# Patient Record
Sex: MALE | Race: WHITE | HISPANIC OR LATINO | URBAN - METROPOLITAN AREA
[De-identification: names, ages, dates, MRNs, and addresses within clinical notes are randomized per-mention and may not be internally consistent; named-entity substitution may affect disease eponyms.]

---

## 2022-12-13 ENCOUNTER — TELEPHONE (OUTPATIENT)
Dept: HEALTH INFORMATION MANAGEMENT | Facility: OTHER | Age: 67
End: 2022-12-13

## 2022-12-21 PROBLEM — E11.9 TYPE 2 DIABETES MELLITUS WITHOUT COMPLICATION, WITHOUT LONG-TERM CURRENT USE OF INSULIN (HCC): Status: ACTIVE | Noted: 2022-12-21

## 2022-12-21 PROBLEM — E66.811 OBESITY (BMI 30.0-34.9): Status: ACTIVE | Noted: 2022-12-21

## 2022-12-21 PROBLEM — E66.9 OBESITY (BMI 30.0-34.9): Status: ACTIVE | Noted: 2022-12-21

## 2022-12-21 PROBLEM — R93.1 AGATSTON CAC SCORE 100-199: Status: ACTIVE | Noted: 2022-12-21

## 2022-12-21 PROBLEM — E78.2 MIXED HYPERLIPIDEMIA: Status: ACTIVE | Noted: 2022-12-21

## 2022-12-21 PROBLEM — E11.65 TYPE 2 DIABETES MELLITUS WITH HYPERGLYCEMIA, WITHOUT LONG-TERM CURRENT USE OF INSULIN (HCC): Status: ACTIVE | Noted: 2022-12-21

## 2022-12-21 PROBLEM — I10 ESSENTIAL HYPERTENSION: Status: ACTIVE | Noted: 2022-12-21

## 2022-12-21 PROBLEM — G47.00 INSOMNIA: Status: ACTIVE | Noted: 2022-12-21

## 2023-01-11 ENCOUNTER — OFFICE VISIT (OUTPATIENT)
Dept: MEDICAL GROUP | Facility: PHYSICIAN GROUP | Age: 68
End: 2023-01-11
Payer: MEDICARE

## 2023-01-11 VITALS
OXYGEN SATURATION: 95 % | RESPIRATION RATE: 12 BRPM | BODY MASS INDEX: 33.19 KG/M2 | SYSTOLIC BLOOD PRESSURE: 120 MMHG | TEMPERATURE: 97.7 F | HEART RATE: 90 BPM | WEIGHT: 219 LBS | DIASTOLIC BLOOD PRESSURE: 64 MMHG | HEIGHT: 68 IN

## 2023-01-11 DIAGNOSIS — E66.9 OBESITY (BMI 30.0-34.9): ICD-10-CM

## 2023-01-11 DIAGNOSIS — E11.9 DIABETES MELLITUS WITHOUT COMPLICATION (HCC): ICD-10-CM

## 2023-01-11 DIAGNOSIS — E78.2 MIXED HYPERLIPIDEMIA: ICD-10-CM

## 2023-01-11 DIAGNOSIS — I10 ESSENTIAL HYPERTENSION: ICD-10-CM

## 2023-01-11 PROBLEM — E11.65 TYPE 2 DIABETES MELLITUS WITH HYPERGLYCEMIA, WITHOUT LONG-TERM CURRENT USE OF INSULIN (HCC): Status: RESOLVED | Noted: 2022-12-21 | Resolved: 2023-01-11

## 2023-01-11 LAB
HBA1C MFR BLD: 7 % (ref 0–5.6)
INT CON NEG: NEGATIVE
INT CON POS: POSITIVE

## 2023-01-11 PROCEDURE — 99204 OFFICE O/P NEW MOD 45 MIN: CPT | Performed by: FAMILY MEDICINE

## 2023-01-11 PROCEDURE — 83036 HEMOGLOBIN GLYCOSYLATED A1C: CPT | Performed by: FAMILY MEDICINE

## 2023-01-11 RX ORDER — METFORMIN HYDROCHLORIDE 500 MG/1
1 TABLET, EXTENDED RELEASE ORAL 2 TIMES DAILY
COMMUNITY
Start: 2022-08-30 | End: 2023-01-11

## 2023-01-11 RX ORDER — AMOXICILLIN AND CLAVULANATE POTASSIUM 875; 125 MG/1; MG/1
1 TABLET, FILM COATED ORAL 2 TIMES DAILY
COMMUNITY
Start: 2022-11-08 | End: 2023-03-01

## 2023-01-11 RX ORDER — LISINOPRIL 5 MG/1
1 TABLET ORAL
COMMUNITY
Start: 2022-11-23 | End: 2023-01-11

## 2023-01-11 RX ORDER — ATORVASTATIN CALCIUM 40 MG/1
1 TABLET, FILM COATED ORAL
COMMUNITY
Start: 2022-11-23 | End: 2023-01-11

## 2023-01-11 RX ORDER — BLOOD SUGAR DIAGNOSTIC
STRIP MISCELLANEOUS
COMMUNITY
Start: 2022-10-24

## 2023-01-11 RX ORDER — TIRZEPATIDE 2.5 MG/.5ML
2.5 INJECTION, SOLUTION SUBCUTANEOUS
COMMUNITY
Start: 2022-11-08 | End: 2023-03-01

## 2023-01-11 ASSESSMENT — ENCOUNTER SYMPTOMS
CHILLS: 0
HEARTBURN: 0
HEADACHES: 0
PSYCHIATRIC NEGATIVE: 1
COUGH: 0
FEVER: 0
BRUISES/BLEEDS EASILY: 0
CONSTITUTIONAL NEGATIVE: 1
RESPIRATORY NEGATIVE: 1
MYALGIAS: 0
MUSCULOSKELETAL NEGATIVE: 1
DEPRESSION: 0
EYES NEGATIVE: 1
BLURRED VISION: 0
NAUSEA: 0
DIZZINESS: 0
CARDIOVASCULAR NEGATIVE: 1
PALPITATIONS: 0
TINGLING: 0
GASTROINTESTINAL NEGATIVE: 1
HEMOPTYSIS: 0
DOUBLE VISION: 0
NEUROLOGICAL NEGATIVE: 1

## 2023-01-11 ASSESSMENT — FIBROSIS 4 INDEX: FIB4 SCORE: 1.34

## 2023-01-11 ASSESSMENT — PATIENT HEALTH QUESTIONNAIRE - PHQ9: CLINICAL INTERPRETATION OF PHQ2 SCORE: 0

## 2023-01-11 NOTE — PROGRESS NOTES
Subjective     Julian Sellers Jr is a 67 y.o. male who presents with [facility]            1. Diabetes mellitus without complication (HCC)  New patient to us and [facility]  Was only on metformin and a1c over 8 but addition last year of trulicity improved it and now 7.0 today in clinic  Unsure if this is covered with new insurance, will have him see pharm to check on cost  Currently treated for DM, taking meds and checking bs at home, trying to do DM diet.controlled       POCT Hemoglobin A1C   Referral to Pharmacotherapy Service   Comp Metabolic Panel; Future   Hemoglobin A1c; Future   Lipid Profile; Future   Microalbumin Creat Ratio Urine - Lab Collect; Future    2. Essential hypertension  Currently treated for HTN, taking meds with no CP or sob, monitors bp at home periodically. controlled        3. Mixed hyperlipidemia  Currently treated for HLD, taking meds with no new myalgias or joint pain, watching fats in diet  controlled        4. Obesity (BMI 30.0-34.9)  Patient has a diagnosis of obesity. They were counseled today on increasing exercise and  extensively counseled on a low carb diet         No past medical history on file.  No past surgical history on file.  Social History    Tobacco Use      Smoking status: Former        Packs/day: 0.25        Years: 15.00        Pack years: 3.75        Types: Cigarettes        Quit date:         Years since quittin.0      Smokeless tobacco: Never    No family history on file.      Current Outpatient Medications: ·  Multiple Vitamin (MULTI-VITAMINS PO), 1 tab once a day, Disp: , Rfl: ·  aspirin 81 MG EC tablet, one orally daily for 90 days, Disp: , Rfl: ·  atorvastatin (LIPITOR) 40 MG Tab, Take 40 mg by mouth every day., Disp: , Rfl: ·  lisinopril (PRINIVIL) 5 MG Tab, Take 5 mg by mouth every day., Disp: , Rfl: ·  TRULICITY 1.5 MG/0.5ML Solution Pen-injector, INJECT 0.5 MLS (1.5 MG) SUBCUTANEOUSLY ONCE A WEEK FOR 90 DAYS. DISPENSE 12 PENS, Disp: , Rfl: ·   loratadine (CLARITIN) 10 MG Tab, TAKE 1 TABLET (10 MG) BY MOUTH DAILY AS NEEDED FOR ALLERGIES, Disp: , Rfl: ·  metFORMIN ER (GLUCOPHAGE XR) 500 MG TABLET SR 24 HR, Take 500 mg by mouth 2 times a day., Disp: , Rfl: ·  Omega-3 Fatty Acids (OMEGA-3 FISH OIL PO), Take 1 Canister by mouth every day. 800 mg, Disp: , Rfl: ·  amoxicillin-clavulanate (AUGMENTIN) 875-125 MG Tab, Take 1 Tablet by mouth 2 times a day. (Patient not taking: Reported on 1/11/2023), Disp: , Rfl: ·  Dulaglutide 1.5 MG/0.5ML Solution Pen-injector, Inject 1.5 mg under the skin. (Patient not taking: Reported on 1/11/2023), Disp: , Rfl: ·  Tirzepatide (MOUNJARO) 2.5 MG/0.5ML Solution Pen-injector, Inject 2.5 mg under the skin. (Patient not taking: Reported on 1/11/2023), Disp: , Rfl: ·  ONETOUCH VERIO strip, USE 1 STRIP TO TEST BLOOD GLUCOSE ONCE DAILY (Patient not taking: Reported on 1/11/2023), Disp: , Rfl: ·  diphenhydrAMINE-APAP, sleep, (TYLENOL PM EXTRA STRENGTH)  MG Tab, Take 1 Tablet by mouth at bedtime as needed. (Patient not taking: Reported on 1/11/2023), Disp: , Rfl:     Patient was instructed on the use of medications, either prescriptions or OTC and informed on when the appropriate follow up time period should be. In addition, patient was also instructed that should any acute worsening occur that they should notify this clinic asap or call 911.            Review of Systems   Constitutional: Negative.  Negative for chills and fever.   HENT: Negative.  Negative for hearing loss.    Eyes: Negative.  Negative for blurred vision and double vision.   Respiratory: Negative.  Negative for cough and hemoptysis.    Cardiovascular: Negative.  Negative for chest pain and palpitations.   Gastrointestinal: Negative.  Negative for heartburn and nausea.   Genitourinary: Negative.  Negative for dysuria.   Musculoskeletal: Negative.  Negative for myalgias.   Skin: Negative.  Negative for rash.   Neurological: Negative.  Negative for dizziness, tingling  "and headaches.   Endo/Heme/Allergies: Negative.  Does not bruise/bleed easily.   Psychiatric/Behavioral: Negative.  Negative for depression and suicidal ideas.    All other systems reviewed and are negative.           Objective     /64 (BP Location: Right arm, Patient Position: Sitting, BP Cuff Size: Adult)   Pulse 90   Temp 36.5 °C (97.7 °F) (Temporal)   Resp 12   Ht 1.715 m (5' 7.5\")   Wt 99.3 kg (219 lb)   SpO2 95%   BMI 33.79 kg/m²      Physical Exam  Vitals and nursing note reviewed.   Constitutional:       General: He is not in acute distress.     Appearance: He is well-developed. He is not diaphoretic.   HENT:      Head: Normocephalic and atraumatic.      Mouth/Throat:      Pharynx: No oropharyngeal exudate.   Eyes:      Pupils: Pupils are equal, round, and reactive to light.   Cardiovascular:      Rate and Rhythm: Normal rate and regular rhythm.      Heart sounds: Normal heart sounds. No murmur heard.    No friction rub. No gallop.   Pulmonary:      Effort: Pulmonary effort is normal. No respiratory distress.      Breath sounds: Normal breath sounds. No wheezing or rales.   Chest:      Chest wall: No tenderness.   Skin:     Comments: Feet clean and dry with good sensation on monofilament exam today     Neurological:      Mental Status: He is alert and oriented to person, place, and time.   Psychiatric:         Behavior: Behavior normal.         Thought Content: Thought content normal.         Judgment: Judgment normal.                           Assessment & Plan        1. Diabetes mellitus without complication (HCC)    - POCT Hemoglobin A1C  - Referral to Pharmacotherapy Service  - Comp Metabolic Panel; Future  - Hemoglobin A1c; Future  - Lipid Profile; Future  - Microalbumin Creat Ratio Urine - Lab Collect; Future    2. Essential hypertension      3. Mixed hyperlipidemia      4. Obesity (BMI 30.0-34.9)                    "

## 2023-01-12 ENCOUNTER — TELEPHONE (OUTPATIENT)
Dept: VASCULAR LAB | Facility: MEDICAL CENTER | Age: 68
End: 2023-01-12
Payer: MEDICARE

## 2023-01-12 NOTE — TELEPHONE ENCOUNTER
[facility]    Received pharmacotherapy referral for DM from Dr. Travis on 1/11/23    Called pt to schedule appt to establish care - no answer. LVM.    Insurance: [facility]  PCP: [facility]  Locations to be seen: Any    [facility] at [phone], fax [phone]    Bala López, HankD, BCACP

## 2023-02-08 ENCOUNTER — APPOINTMENT (OUTPATIENT)
Dept: MEDICAL GROUP | Facility: PHYSICIAN GROUP | Age: 68
End: 2023-02-08
Payer: MEDICARE

## 2023-03-01 ENCOUNTER — NON-PROVIDER VISIT (OUTPATIENT)
Dept: MEDICAL GROUP | Facility: PHYSICIAN GROUP | Age: 68
End: 2023-03-01
Payer: MEDICARE

## 2023-03-01 PROCEDURE — 99211 OFF/OP EST MAY X REQ PHY/QHP: CPT | Performed by: STUDENT IN AN ORGANIZED HEALTH CARE EDUCATION/TRAINING PROGRAM

## 2023-03-01 RX ORDER — METFORMIN HYDROCHLORIDE 500 MG/1
1 TABLET, EXTENDED RELEASE ORAL 2 TIMES DAILY
COMMUNITY
Start: 2023-02-22 | End: 2023-12-05 | Stop reason: SDUPTHER

## 2023-03-01 RX ORDER — DULAGLUTIDE 1.5 MG/.5ML
1 INJECTION, SOLUTION SUBCUTANEOUS
Qty: 2.24 ML | Refills: 6 | Status: SHIPPED | OUTPATIENT
Start: 2023-03-01 | End: 2023-07-19

## 2023-03-01 NOTE — PROGRESS NOTES
Patient Consult Note - Initial Visit    TIME IN: 2:30pm  TIME OUT: 2:56pm    Primary care physician: Wilfredo Travis M.D.    Reason for consult: Management of Uncontrolled Type 2 Diabetes    HPI:  Julian Sellers Jr is a 67 y.o. old patient who comes in today for evaluation of above stated problem.    Most Recent HbA1c:   Lab Results   Component Value Date/Time    HBA1C 7.0 (A) 01/11/2023 08:59 AM      Lab Results   Component Value Date/Time    CREATININE 0.88 06/18/2022 09:05 AM              Diabetes Medication History and Current Regimen  Metformin ER 500mg BID   GLP-1 Agent: Dulaglutide 1.5 mg once weekly     Previously attempted medications  Rybelsus - stopped in favor of Trulicity     Pt has home glucometer and proper testing technique - Yes    Pt reports blood sugars:   Before Breakfast: 110-130, will spike to 170's-180's if poor diet in evening  Before Lunch:   Before Dinner:   Before Bedtime:   Other times:     Hypoglycemia awareness - Yes  Nocturnal hypoglycemia- None  Hypoglycemia:  None    Pt's treatment of Hypoglycemia - 15:15 Rule    Current Exercise - 45-60min ~three to four days a week via hiking and treadmill walking.  Exercise Goal - continue with current exercise patterns    Dietary -  Well rounded diet with good portion control.  Had diabetic nutrition education in the past.    Tries to keep things low simple carb and focus on things such as vegetables, chicken/fish/pork.  Does have cereal and oatmeal for breakfast frequently, but tries to eat options that are high in fiber.  Does snack on almonds and citrus fruits during the day.  Well hydrated with water, no sugary drinks.    Foot Exam:  Monofilament exam - up to date, not conducted today.    Preventative Management  BP regimen (ACE/ARB) - Lisinopril 5mg QD  ASA - 81mg QD  Statin - Atorvastatin 40mg QD  Last Retinal Scan - 12/2022  Last Foot Exam - 1/2023  Last A1c -   Lab Results   Component Value Date/Time    HBA1C 7.0 (A) 01/11/2023 08:59  AM      Last Microalbuminuria - ordered by PCP to have done by 2023    Past Medical History:  Patient Active Problem List    Diagnosis Date Noted    Diabetes mellitus without complication (HCC) 2023    Mixed hyperlipidemia 2022    Essential hypertension 2022    Obesity (BMI 30.0-34.9) 2022    Agatston CAC score 100-199 2022    Insomnia 2022       Past Surgical History:  No past surgical history on file.    Allergies:  Patient has no known allergies.    Social History:  Social History     Socioeconomic History    Marital status: Single     Spouse name: Not on file    Number of children: Not on file    Years of education: Not on file    Highest education level: Not on file   Occupational History    Not on file   Tobacco Use    Smoking status: Former     Packs/day: 0.25     Years: 15.00     Pack years: 3.75     Types: Cigarettes     Quit date:      Years since quittin.    Smokeless tobacco: Never   Substance and Sexual Activity    Alcohol use: Not on file    Drug use: Not on file    Sexual activity: Not on file   Other Topics Concern    Not on file   Social History Narrative    Not on file     Social Determinants of Health     Financial Resource Strain: Not on file   Food Insecurity: Not on file   Transportation Needs: Not on file   Physical Activity: Not on file   Stress: Not on file   Social Connections: Not on file   Intimate Partner Violence: Not on file   Housing Stability: Not on file       Family History:  No family history on file.    Medications:    Current Outpatient Medications:     amoxicillin-clavulanate (AUGMENTIN) 875-125 MG Tab, Take 1 Tablet by mouth 2 times a day. (Patient not taking: Reported on 2023), Disp: , Rfl:     Dulaglutide 1.5 MG/0.5ML Solution Pen-injector, Inject 1.5 mg under the skin. (Patient not taking: Reported on 2023), Disp: , Rfl:     Tirzepatide (MOUNJARO) 2.5 MG/0.5ML Solution Pen-injector, Inject 2.5 mg under the skin.  (Patient not taking: Reported on 1/11/2023), Disp: , Rfl:     ONETOUCH VERIO strip, USE 1 STRIP TO TEST BLOOD GLUCOSE ONCE DAILY (Patient not taking: Reported on 1/11/2023), Disp: , Rfl:     Multiple Vitamin (MULTI-VITAMINS PO), 1 tab once a day, Disp: , Rfl:     aspirin 81 MG EC tablet, one orally daily for 90 days, Disp: , Rfl:     atorvastatin (LIPITOR) 40 MG Tab, Take 40 mg by mouth every day., Disp: , Rfl:     lisinopril (PRINIVIL) 5 MG Tab, Take 5 mg by mouth every day., Disp: , Rfl:     TRULICITY 1.5 MG/0.5ML Solution Pen-injector, INJECT 0.5 MLS (1.5 MG) SUBCUTANEOUSLY ONCE A WEEK FOR 90 DAYS. DISPENSE 12 PENS, Disp: , Rfl:     loratadine (CLARITIN) 10 MG Tab, TAKE 1 TABLET (10 MG) BY MOUTH DAILY AS NEEDED FOR ALLERGIES, Disp: , Rfl:     metFORMIN ER (GLUCOPHAGE XR) 500 MG TABLET SR 24 HR, Take 500 mg by mouth 2 times a day., Disp: , Rfl:     Omega-3 Fatty Acids (OMEGA-3 FISH OIL PO), Take 1 Canister by mouth every day. 800 mg, Disp: , Rfl:     diphenhydrAMINE-APAP, sleep, (TYLENOL PM EXTRA STRENGTH)  MG Tab, Take 1 Tablet by mouth at bedtime as needed. (Patient not taking: Reported on 1/11/2023), Disp: , Rfl:     Labs: Reviewed    Physical Examination:  Vital signs: There were no vitals taken for this visit. There is no height or weight on file to calculate BMI.    Assessment and Plan:    1. DM2  Patient seen today for initial visit, referred by PCP.  Longstanding hx of diabetes, previously managed by [facility] in [place].  A1c is under good control, his biggest concern was that Trulicity was >$700/month with previous insurance and he is not sure what [facility] is going to look like.    Basic physiology of DMII was explained to patient as well as microvascular/macrovascular complications. The importance of increasing physical activity to improve diabetes control was discussed with the patient. Patient was also educated on changing diet and making better choices to help control blood sugar.  Discussed FBG goal of  , 2hr PP <180, and A1c <7.0%.      - Medication changes -   None, continue with all current medications.  Cost of Trulicity should be much lower with SCP coverage.     - Lifestyle changes -   Diet:  Continue to minimize simple carb intake and focusing on lean proteins and veggies.  Exercise:  Continue with current exercise program.    Follow Up:  Six weeks for A1c    Kody Pugh, PharmDBCACP  03/01/23    CC:   Wilfredo Travis M.D.                                                   [facility] Program Consent      Name    Julian Sellers Jr    MRN number: [id number]    the following are guidelines for participation in the [facility] Program.     I, ____Julian Sellers Jr_____, understand and voluntarily agree to participate in the [facility] and to have services provided to me by pharmacists working in collaboration with my provider.    I understand the pharmacist within the [facility] Program may initiate, modify or discontinue my medications, order appropriate testing and appointments, perform exams, monitor treatment, and make clinical evaluations and decisions pursuant to a collaborative practice agreement with my provider.  I understand the pharmacist within the [facility] Program is not a physician, osteopathic physician, advanced practice registered nurse or physician assistant and may not diagnose.  I will take all my medications as instructed and not change the way I take it without first talking to my provider or a pharmacist within the [facility].  I understand that if I am late to my appointment I may not be able to be seen by a pharmacist at that time and will have to reschedule my appointment.  During appointment with pharmacist I understand that pharmacist has the right not to answer questions or perform services outside the pharmacist’s scope of practice.  By  signing below, I provide informed consent for the pharmacist to provide these services and for my participation in the [facility] Pharmacotherapy Program.      Julian Sellers            [id number]          03/01/23  Patient Name                   MRN number  Date     ___X_Obtained verbal consent from pt, No signature due to COVID concerns___   Patient Signature

## 2023-04-11 ENCOUNTER — HOSPITAL ENCOUNTER (OUTPATIENT)
Dept: LAB | Facility: MEDICAL CENTER | Age: 68
End: 2023-04-11
Attending: FAMILY MEDICINE
Payer: MEDICARE

## 2023-04-11 DIAGNOSIS — E11.9 DIABETES MELLITUS WITHOUT COMPLICATION (HCC): ICD-10-CM

## 2023-04-11 LAB
ALBUMIN SERPL BCP-MCNC: 4.4 G/DL (ref 3.2–4.9)
ALBUMIN/GLOB SERPL: 1.3 G/DL
ALP SERPL-CCNC: 61 U/L (ref 30–99)
ALT SERPL-CCNC: 12 U/L (ref 2–50)
ANION GAP SERPL CALC-SCNC: 13 MMOL/L (ref 7–16)
AST SERPL-CCNC: 12 U/L (ref 12–45)
BILIRUB SERPL-MCNC: 0.9 MG/DL (ref 0.1–1.5)
BUN SERPL-MCNC: 16 MG/DL (ref 8–22)
CALCIUM ALBUM COR SERPL-MCNC: 8.9 MG/DL (ref 8.5–10.5)
CALCIUM SERPL-MCNC: 9.2 MG/DL (ref 8.5–10.5)
CHLORIDE SERPL-SCNC: 103 MMOL/L (ref 96–112)
CHOLEST SERPL-MCNC: 99 MG/DL (ref 100–199)
CO2 SERPL-SCNC: 23 MMOL/L (ref 20–33)
CREAT SERPL-MCNC: 0.83 MG/DL (ref 0.5–1.4)
EST. AVERAGE GLUCOSE BLD GHB EST-MCNC: 137 MG/DL
FASTING STATUS PATIENT QL REPORTED: NORMAL
GFR SERPLBLD CREATININE-BSD FMLA CKD-EPI: 95 ML/MIN/1.73 M 2
GLOBULIN SER CALC-MCNC: 3.3 G/DL (ref 1.9–3.5)
GLUCOSE SERPL-MCNC: 106 MG/DL (ref 65–99)
HBA1C MFR BLD: 6.4 % (ref 4–5.6)
HDLC SERPL-MCNC: 43 MG/DL
LDLC SERPL CALC-MCNC: 35 MG/DL
POTASSIUM SERPL-SCNC: 4.2 MMOL/L (ref 3.6–5.5)
PROT SERPL-MCNC: 7.7 G/DL (ref 6–8.2)
SODIUM SERPL-SCNC: 139 MMOL/L (ref 135–145)
TRIGL SERPL-MCNC: 105 MG/DL (ref 0–149)

## 2023-04-11 PROCEDURE — 80053 COMPREHEN METABOLIC PANEL: CPT

## 2023-04-11 PROCEDURE — 80061 LIPID PANEL: CPT

## 2023-04-11 PROCEDURE — 36415 COLL VENOUS BLD VENIPUNCTURE: CPT

## 2023-04-11 PROCEDURE — 83036 HEMOGLOBIN GLYCOSYLATED A1C: CPT

## 2023-04-11 PROCEDURE — 82570 ASSAY OF URINE CREATININE: CPT

## 2023-04-11 PROCEDURE — 82043 UR ALBUMIN QUANTITATIVE: CPT

## 2023-04-12 ENCOUNTER — TELEPHONE (OUTPATIENT)
Dept: MEDICAL GROUP | Facility: PHYSICIAN GROUP | Age: 68
End: 2023-04-12
Payer: MEDICARE

## 2023-04-12 LAB
CREAT UR-MCNC: 147.38 MG/DL
MICROALBUMIN UR-MCNC: 1.6 MG/DL
MICROALBUMIN/CREAT UR: 11 MG/G (ref 0–30)

## 2023-04-12 NOTE — TELEPHONE ENCOUNTER
----- Message from Wilfredo Travis M.D. sent at 4/12/2023  7:17 AM PDT -----  This patient needs an appointment within the next week. Please schedule this with the patient so we may discuss their lab results

## 2023-04-12 NOTE — TELEPHONE ENCOUNTER
Attempted to contact patient to schedule with provider. Lvm for patient to [facility].

## 2023-04-19 ENCOUNTER — NON-PROVIDER VISIT (OUTPATIENT)
Dept: MEDICAL GROUP | Facility: PHYSICIAN GROUP | Age: 68
End: 2023-04-19
Payer: MEDICARE

## 2023-04-19 VITALS
BODY MASS INDEX: 34.23 KG/M2 | DIASTOLIC BLOOD PRESSURE: 84 MMHG | HEART RATE: 90 BPM | WEIGHT: 221.8 LBS | SYSTOLIC BLOOD PRESSURE: 125 MMHG

## 2023-04-19 PROCEDURE — 99211 OFF/OP EST MAY X REQ PHY/QHP: CPT | Performed by: NURSE PRACTITIONER

## 2023-04-19 ASSESSMENT — FIBROSIS 4 INDEX: FIB4 SCORE: 1.36

## 2023-04-19 NOTE — PROGRESS NOTES
Patient Consult Note    TIME IN: 1011  TIME OUT: 1032    Primary care physician: Wilfredo Travis M.D.    Reason for consult: Management of Controlled Type 2 Diabetes    HPI:  Julian Sellers Jr is a 67 y.o. old patient who comes in today for evaluation of above stated problem.    Most Recent HbA1c and POCT glucose:   Lab Results   Component Value Date/Time    HBA1C 6.4 (H) 04/11/2023 12:16 PM            Most Recent Scr:  Lab Results   Component Value Date/Time    CREATININE 0.83 04/11/2023 12:16 PM        Current Diabetes Medication Regimen  Metformin  mg bid   GLP-1 Agent: Trulicity 1.5mg q 7 days on Saturday- $120 for 90ds    Previous Diabetes Medications and Reason for Discontinuation  Metformin ER unable to tolerate 1500mg/day    Pt has home glucometer and proper testing technique -   Discussed BG Goals: FBG 80 - 130, 2hPP < 180, A1c < 7%    Pt reports blood sugars:   Before Breakfast: 110-120    Hypoglycemia awareness - yes  Nocturnal hypoglycemia- no  Hypoglycemia:  None    Pt's treatment of Hypoglycemia   - 15:15 Rule    Current Exercise - walking and using the treadmill (50min session) occassionally  Exercise Goal - increase as tolerated to 150min/week; start to incorporate strength training    Dietary - cutting out rice & potatoes; limiting bread. Increasing vegetable intake. Tries to cook most of his meals.    Foot Exam:  Monofilament exam - 1/11/2023  Monofilament testing with a 10 gram force: sensation intact: decreased bilaterally.    Visual Inspection: Feet without maceration, ulcers, fissures.  Feet dry.  Pedal pulses: intact bilaterally    Preventative Management  BP regimen (ACE/ARB) - lisinopril  BP goal < 140/90  Statin - atorvastatin  LDL <100 - 4/11/23 - 35   Last Retinal Scan - 12/6/2022  Last Microalbumin/Cr - 4/11/2023  Last A1c -   Lab Results   Component Value Date/Time    HBA1C 6.4 (H) 04/11/2023 12:16 PM          updated caregaps    Past Medical History:  Patient Active Problem  List    Diagnosis Date Noted    Diabetes mellitus without complication (HCC) 2023    Mixed hyperlipidemia 2022    Essential hypertension 2022    Obesity (BMI 30.0-34.9) 2022    Agatston CAC score 100-199 2022    Insomnia 2022       Past Surgical History:  No past surgical history on file.    Allergies:  Patient has no known allergies.    Social History:  Social History     Socioeconomic History    Marital status: Single     Spouse name: Not on file    Number of children: Not on file    Years of education: Not on file    Highest education level: Not on file   Occupational History    Not on file   Tobacco Use    Smoking status: Former     Packs/day: 0.25     Years: 15.00     Pack years: 3.75     Types: Cigarettes     Quit date:      Years since quittin.3    Smokeless tobacco: Never   Substance and Sexual Activity    Alcohol use: Not on file    Drug use: Not on file    Sexual activity: Not on file   Other Topics Concern    Not on file   Social History Narrative    Not on file     Social Determinants of Health     Financial Resource Strain: Not on file   Food Insecurity: Not on file   Transportation Needs: Not on file   Physical Activity: Not on file   Stress: Not on file   Social Connections: Not on file   Intimate Partner Violence: Not on file   Housing Stability: Not on file       Family History:  No family history on file.    Medications:    Current Outpatient Medications:     metFORMIN ER (GLUCOPHAGE XR) 500 MG TABLET SR 24 HR, Take 1 Tablet by mouth 2 times a day., Disp: , Rfl:     Dulaglutide (TRULICITY) 1.5 MG/0.5ML Solution Pen-injector, Inject 1 PEN under the skin every 7 days., Disp: 2.24 mL, Rfl: 6    ONETOUCH VERIO strip, , Disp: , Rfl:     Multiple Vitamin (MULTI-VITAMINS PO), 1 tab once a day, Disp: , Rfl:     aspirin 81 MG EC tablet, one orally daily for 90 days, Disp: , Rfl:     atorvastatin (LIPITOR) 40 MG Tab, Take 40 mg by mouth every day., Disp: , Rfl:      lisinopril (PRINIVIL) 5 MG Tab, Take 5 mg by mouth every day., Disp: , Rfl:     loratadine (CLARITIN) 10 MG Tab, TAKE 1 TABLET (10 MG) BY MOUTH DAILY AS NEEDED FOR ALLERGIES, Disp: , Rfl:     Omega-3 Fatty Acids (OMEGA-3 FISH OIL PO), Take 1 Canister by mouth every day. 800 mg, Disp: , Rfl:     Labs: Reviewed    Physical Examination:  Vital signs: /84   Pulse 90  There is no height or weight on file to calculate BMI.    Assessment and Plan:    1. DM2  Pt is tolerating his current medication regimen  Discussed Goals: FBG 80 - 130, 2hPP < 180, a1c < 7.0%   A1c now at goal 6.4% on 4/11/23 (decreased from 7.0%)  Pt has made significant changes to lifestyle - he is motivated to lose more weight however he is not ready to increase GLP1RA today. He wants to work on weight loss via T[facility]    - Medication changes:  Continue metformin ER 500mg bid  Continue Trulicity 1.5mg q 7 days     - Lifestyle changes:  Increase physical activity to 150min/week, start doing strength training  Continue to limit carb intake and increase protein/veggies intake    Follow Up:  3 months    Zandra Recio, PharmD    CC:   Wilfredo Travis M.D.

## 2023-05-05 PROBLEM — D75.1 POLYCYTHEMIA: Status: ACTIVE | Noted: 2023-05-05

## 2023-07-19 ENCOUNTER — NON-PROVIDER VISIT (OUTPATIENT)
Dept: MEDICAL GROUP | Facility: PHYSICIAN GROUP | Age: 68
End: 2023-07-19
Payer: MEDICARE

## 2023-07-19 DIAGNOSIS — E11.9 TYPE 2 DIABETES MELLITUS WITHOUT COMPLICATION, WITHOUT LONG-TERM CURRENT USE OF INSULIN (HCC): ICD-10-CM

## 2023-07-19 LAB
HBA1C MFR BLD: 7.4 % (ref ?–5.8)
POCT INT CON NEG: NEGATIVE
POCT INT CON POS: POSITIVE

## 2023-07-19 PROCEDURE — 99211 OFF/OP EST MAY X REQ PHY/QHP: CPT | Performed by: NURSE PRACTITIONER

## 2023-07-19 PROCEDURE — 83036 HEMOGLOBIN GLYCOSYLATED A1C: CPT | Performed by: NURSE PRACTITIONER

## 2023-07-19 RX ORDER — DULAGLUTIDE 3 MG/.5ML
3 INJECTION, SOLUTION SUBCUTANEOUS
Qty: 2 ML | Refills: 3 | Status: SHIPPED | OUTPATIENT
Start: 2023-07-19 | End: 2023-10-18

## 2023-07-19 NOTE — PROGRESS NOTES
Patient Consult Note    TIME IN: 7:55 AM  TIME OUT: 8:15 AM    Primary care physician: Wilfredo Travis M.D.    Reason for consult: Management of Controlled Type 2 Diabetes    HPI:  Julian Sellers Jr is a 67 y.o. old patient who comes in today for evaluation of above stated problem.    Most Recent HbA1c and POCT glucose:   Lab Results   Component Value Date/Time    HBA1C 7.4 (A) 07/19/2023 08:03 AM            Most Recent Scr:  Lab Results   Component Value Date/Time    CREATININE 0.83 04/11/2023 12:16 PM        Current Diabetes Medication Regimen  Metformin  mg bid   GLP-1 Agent: Trulicity 1.5mg q 7 days on Saturday- $120 for 90ds    Previous Diabetes Medications and Reason for Discontinuation  Metformin ER unable to tolerate 1500mg/day    Pt has home glucometer and proper testing technique -   Discussed BG Goals: FBG 80 - 130, 2hPP < 180, A1c < 7%    Pt reports blood sugars:   Before Breakfast: 120-130    Hypoglycemia awareness - yes  Nocturnal hypoglycemia- no  Hypoglycemia:  None    Pt's treatment of Hypoglycemia   - 15:15 Rule    Current Exercise - walking and using the treadmill (50min session) occassionally  Exercise Goal - increase as tolerated to 150min/week; start to incorporate strength training    Dietary - cutting out rice & potatoes; limiting bread. Increasing vegetable intake. Tries to cook most of his meals.    Foot Exam:  Monofilament exam - 1/11/2023    Preventative Management  BP regimen (ACE/ARB) - lisinopril  BP goal < 140/90  Statin - atorvastatin  LDL <100 - 4/11/23 - 35   Last Retinal Scan - 12/6/2022  Last Microalbumin/Cr - 4/11/2023  Last A1c -   Lab Results   Component Value Date/Time    HBA1C 7.4 (A) 07/19/2023 08:03 AM        Past Medical History:  Patient Active Problem List    Diagnosis Date Noted    BMI 33.0-33.9,adult 05/05/2023    Polycythemia 05/05/2023    Diabetes mellitus without complication (HCC) 01/11/2023    Mixed hyperlipidemia 12/21/2022    Essential hypertension  2022    Obesity (BMI 30.0-34.9) 2022    Agatston CAC score 100-199 2022    Insomnia 2022       Past Surgical History:  No past surgical history on file.    Allergies:  Patient has no known allergies.    Social History:  Social History     Socioeconomic History    Marital status: Single     Spouse name: Not on file    Number of children: Not on file    Years of education: Not on file    Highest education level: Not on file   Occupational History    Not on file   Tobacco Use    Smoking status: Former     Packs/day: 0.25     Years: 15.00     Pack years: 3.75     Types: Cigarettes     Quit date:      Years since quittin.5    Smokeless tobacco: Never   Substance and Sexual Activity    Alcohol use: Not on file    Drug use: Not on file    Sexual activity: Not on file   Other Topics Concern    Not on file   Social History Narrative    Not on file     Social Determinants of Health     Financial Resource Strain: Not on file   Food Insecurity: Not on file   Transportation Needs: Not on file   Physical Activity: Not on file   Stress: Not on file   Social Connections: Not on file   Intimate Partner Violence: Not on file   Housing Stability: Not on file       Family History:  No family history on file.    Medications:    Current Outpatient Medications:     metFORMIN ER (GLUCOPHAGE XR) 500 MG TABLET SR 24 HR, Take 1 Tablet by mouth 2 times a day., Disp: , Rfl:     Dulaglutide (TRULICITY) 1.5 MG/0.5ML Solution Pen-injector, Inject 1 PEN under the skin every 7 days., Disp: 2.24 mL, Rfl: 6    ONETOUCH VERIO strip, , Disp: , Rfl:     Multiple Vitamin (MULTI-VITAMINS PO), 1 tab once a day, Disp: , Rfl:     aspirin 81 MG EC tablet, one orally daily for 90 days, Disp: , Rfl:     atorvastatin (LIPITOR) 40 MG Tab, Take 40 mg by mouth every day., Disp: , Rfl:     lisinopril (PRINIVIL) 5 MG Tab, Take 5 mg by mouth every day., Disp: , Rfl:     loratadine (CLARITIN) 10 MG Tab, TAKE 1 TABLET (10 MG) BY MOUTH  DAILY AS NEEDED FOR ALLERGIES, Disp: , Rfl:     Omega-3 Fatty Acids (OMEGA-3 FISH OIL PO), Take 1 Canister by mouth every day. 800 mg, Disp: , Rfl:     Labs: Reviewed    Physical Examination:  Vital signs: There were no vitals taken for this visit. There is no height or weight on file to calculate BMI.    Assessment and Plan:    1. DM2  Pt is tolerating his current medication regimen  Discussed Goals: FBG 80 - 130, 2hPP < 180, a1c < 7.0%   A1c increased back to 7.4% increased from prior, patient states he was just on vacation and was not cooking for himself.   FBG at goal likely PP blood sugars are causing A1c to increase  Will increase GLP1RA to maximize glycemic control    - Medication changes:  Continue metformin ER 500mg bid  Increase Trulicity 3mg q 7 days     - Lifestyle changes:  Increase physical activity to 150min/week, continue exercise through the summer  Continue to limit carb intake and increase protein/veggies intake    Follow Up:  3 months    Alice Georges PharmD    CC:   Wilfredo Travis M.D.

## 2023-10-02 PROCEDURE — RXMED WILLOW AMBULATORY MEDICATION CHARGE: Performed by: INTERNAL MEDICINE

## 2023-10-03 ENCOUNTER — PHARMACY VISIT (OUTPATIENT)
Dept: PHARMACY | Facility: MEDICAL CENTER | Age: 68
End: 2023-10-03
Payer: COMMERCIAL

## 2023-10-03 RX ORDER — INFLUENZA A VIRUS A/MICHIGAN/45/2015 X-275 (H1N1) ANTIGEN (FORMALDEHYDE INACTIVATED), INFLUENZA A VIRUS A/SINGAPORE/INFIMH-16-0019/2016 IVR-186 (H3N2) ANTIGEN (FORMALDEHYDE INACTIVATED), INFLUENZA B VIRUS B/PHUKET/3073/2013 ANTIGEN (FORMALDEHYDE INACTIVATED), AND INFLUENZA B VIRUS B/MARYLAND/15/2016 BX-69A ANTIGEN (FORMALDEHYDE INACTIVATED) 60; 60; 60; 60 UG/.7ML; UG/.7ML; UG/.7ML; UG/.7ML
INJECTION, SUSPENSION INTRAMUSCULAR
Qty: 0.7 ML | Refills: 0 | Status: SHIPPED | OUTPATIENT
Start: 2023-10-02 | End: 2024-02-15

## 2023-10-11 DIAGNOSIS — E11.9 DIABETES MELLITUS WITHOUT COMPLICATION (HCC): ICD-10-CM

## 2023-10-12 RX ORDER — LISINOPRIL 5 MG/1
5 TABLET ORAL
Qty: 100 TABLET | Refills: 3 | Status: SHIPPED | OUTPATIENT
Start: 2023-10-12

## 2023-10-18 ENCOUNTER — NON-PROVIDER VISIT (OUTPATIENT)
Dept: MEDICAL GROUP | Facility: PHYSICIAN GROUP | Age: 68
End: 2023-10-18
Payer: MEDICARE

## 2023-10-18 DIAGNOSIS — E11.9 TYPE 2 DIABETES MELLITUS WITHOUT COMPLICATION, WITHOUT LONG-TERM CURRENT USE OF INSULIN (HCC): ICD-10-CM

## 2023-10-18 DIAGNOSIS — E11.9 DIABETES MELLITUS WITHOUT COMPLICATION (HCC): ICD-10-CM

## 2023-10-18 LAB
HBA1C MFR BLD: 7.7 % (ref ?–5.8)
POCT INT CON NEG: NEGATIVE
POCT INT CON POS: POSITIVE

## 2023-10-18 PROCEDURE — 83036 HEMOGLOBIN GLYCOSYLATED A1C: CPT | Performed by: FAMILY MEDICINE

## 2023-10-18 PROCEDURE — 99211 OFF/OP EST MAY X REQ PHY/QHP: CPT | Performed by: STUDENT IN AN ORGANIZED HEALTH CARE EDUCATION/TRAINING PROGRAM

## 2023-10-18 RX ORDER — DULAGLUTIDE 4.5 MG/.5ML
1 INJECTION, SOLUTION SUBCUTANEOUS
Qty: 2 ML | Refills: 12 | Status: SHIPPED | OUTPATIENT
Start: 2023-10-18

## 2023-10-18 NOTE — PROGRESS NOTES
Patient Consult Note - Follow Up Visit  Primary care physician: Wilfredo Travis M.D.    Reason for consult: Management of Uncontrolled Type 2 Diabetes    Time IN:  8:29am  Time OUT:  0847    HPI:  Julian Sellers Jr is a 68 y.o. old patient who comes in today for evaluation of above stated problem.    Most Recent HbA1c:   Lab Results   Component Value Date/Time    HBA1C 7.4 (A) 07/19/2023 08:03 AM        Current Diabetes Medication Regimen  Metformin  mg bid   GLP-1 Agent: Trulicity 3mg q 7 days on Saturday  - $200 a month, pt. Okay with cost    Previous Diabetes Medications and Reason for Discontinuation  Metformin ER unable to tolerate 1500mg/day     Pt has home glucometer and proper testing technique -   Discussed BG Goals: FBG 80 - 130, 2hPP < 180, A1c < 7%     Pt reports blood sugars:   Before Breakfast: <130-140  - usually below 130    Hypoglycemia:  None    Diet/Exercise:  Diet: Same as previous, mindful low CHO pt. Note eating balanced meals. Portion sizes have not decreased with increased trulicity    Exercise: 3-4 times a week walking 40-50 minutes    Foot Exam:  Monofilament exam - 1/11/2023  - some tinglihing when sleeping on big toe.      Preventative Management  BP regimen (ACE/ARB) - lisinopril  BP goal < 140/90, home BP usually < 130/80  Statin - atorvastatin  LDL <100 - 4/11/23 - 35   Last Retinal Scan - 12/6/2022  Last A1c -   Lab Results   Component Value Date/Time    HBA1C 7.4 (A) 07/19/2023 08:03 AM      Last Microalbuminuria - 4/2023   Latest Reference Range & Units 04/11/23 12:16   Micro Alb Creat Ratio 0 - 30 mg/g 11   Creatinine, Urine mg/dL 147.38   Microalbumin, Urine Random mg/dL 1.6       ROS:  Constitutional: No weight loss  Cardiac: No palpitations or racing heart  Resp: No shortness of breath  Neuro: No numbness or tinging in feet  Endo: No heat or cold intolerance, no polyuria or polydipsia  All other systems were reviewed and were negative.    Past Medical History:  Patient  Active Problem List    Diagnosis Date Noted    BMI 33.0-33.9,adult 2023    Polycythemia 2023    Diabetes mellitus without complication (HCC) 2023    Mixed hyperlipidemia 2022    Essential hypertension 2022    Obesity (BMI 30.0-34.9) 2022    Agatston CAC score 100-199 2022    Insomnia 2022       Past Surgical History:  No past surgical history on file.    Allergies:  Patient has no known allergies.    Social History:  Social History     Socioeconomic History    Marital status: Single     Spouse name: Not on file    Number of children: Not on file    Years of education: Not on file    Highest education level: Not on file   Occupational History    Not on file   Tobacco Use    Smoking status: Former     Current packs/day: 0.00     Average packs/day: 0.3 packs/day for 15.0 years (3.8 ttl pk-yrs)     Types: Cigarettes     Start date:      Quit date:      Years since quittin.8    Smokeless tobacco: Never   Substance and Sexual Activity    Alcohol use: Not on file    Drug use: Not on file    Sexual activity: Not on file   Other Topics Concern    Not on file   Social History Narrative    Not on file     Social Determinants of Health     Financial Resource Strain: Not on file   Food Insecurity: Not on file   Transportation Needs: Not on file   Physical Activity: Not on file   Stress: Not on file   Social Connections: Not on file   Intimate Partner Violence: Not on file   Housing Stability: Not on file       Family History:  No family history on file.    Medications:    Current Outpatient Medications:     lisinopril (PRINIVIL) 5 MG Tab, Take 1 Tablet by mouth every day., Disp: 100 Tablet, Rfl: 3    influenza Vac High-Dose Quad (FLUZONE HIGH-DOSE QUADRIVALENT) 0.7 ML Suspension Prefilled Syringe injection, Inject  into the shoulder, thigh, or buttocks., Disp: 0.7 mL, Rfl: 0    Dulaglutide (TRULICITY) 3 MG/0.5ML Solution Pen-injector, Inject 3 mg under the skin every 7  days., Disp: 2 mL, Rfl: 3    metFORMIN ER (GLUCOPHAGE XR) 500 MG TABLET SR 24 HR, Take 1 Tablet by mouth 2 times a day., Disp: , Rfl:     ONETOUCH VERIO strip, , Disp: , Rfl:     Multiple Vitamin (MULTI-VITAMINS PO), 1 tab once a day, Disp: , Rfl:     aspirin 81 MG EC tablet, one orally daily for 90 days, Disp: , Rfl:     atorvastatin (LIPITOR) 40 MG Tab, Take 40 mg by mouth every day., Disp: , Rfl:     loratadine (CLARITIN) 10 MG Tab, TAKE 1 TABLET (10 MG) BY MOUTH DAILY AS NEEDED FOR ALLERGIES, Disp: , Rfl:     Omega-3 Fatty Acids (OMEGA-3 FISH OIL PO), Take 1 Canister by mouth every day. 800 mg, Disp: , Rfl:     Labs: Reviewed    Physical Examination:  Vital signs: There were no vitals taken for this visit. There is no height or weight on file to calculate BMI.  General: No apparent distress, cooperative  Eyes: No scleral icterus or discharge  ENMT: Normal on external inspection of nose, lips, normal thyroid exam  Neck: No abnormal masses on inspection  Resp: Normal effort, clear to auscultation bilaterally   CVS: Regular rate and rhythm, S1 S2 normal, no murmur   Extremities: No edema  Abdomen: abdominal obesity present  Neuro: Alert and oriented  Skin: No rash  Psych: Normal mood and affect, intact memory and able to make informed decisions    Assessment and Plan:    Patient A1c has worsened to 7.7. Patient reported fasting blood sugars slightly above desired goal of less than 130mg/dl with no reports of hypoglycemia.    Patient is tolerating recent increase in Trulicity, but feels that their appetite/portion size has not changed. Cost for the medication is high but affordable for the patient. Currently all other medications are well tolerated.     Med changes  - Continue Metformin  - Increase Trulicity to 4.5mg once current supply of 3mg is exhausted.     Lifestyle  - Patient to focus on reducing CHO for large dinners, increase veggie and protein intake.   - Patient to maintain and increase activity level as  tolerated.     Follow Up:  6 weeks    Thank you for allowing me to participate in the care of this patient.    Kody Pugh, PharmD, BCACP    Judd Munoz, Pharmacy Intern    10/18/23    CC:   Wilfredo Travis M.D.

## 2023-11-01 DIAGNOSIS — E11.9 TYPE 2 DIABETES MELLITUS WITHOUT COMPLICATION, WITHOUT LONG-TERM CURRENT USE OF INSULIN (HCC): ICD-10-CM

## 2023-11-02 RX ORDER — ATORVASTATIN CALCIUM 40 MG/1
40 TABLET, FILM COATED ORAL
Qty: 100 TABLET | Refills: 3 | Status: SHIPPED | OUTPATIENT
Start: 2023-11-02

## 2023-11-02 NOTE — TELEPHONE ENCOUNTER
Received request via: Patient    Was the patient seen in the last year in this department? Yes    Does the patient have an active prescription (recently filled or refills available) for medication(s) requested? No    Does the patient have group home Plus and need 100 day supply (blood pressure, diabetes and cholesterol meds only)? Yes

## 2023-11-29 ENCOUNTER — NON-PROVIDER VISIT (OUTPATIENT)
Dept: MEDICAL GROUP | Facility: PHYSICIAN GROUP | Age: 68
End: 2023-11-29
Payer: MEDICARE

## 2023-11-29 PROCEDURE — 99211 OFF/OP EST MAY X REQ PHY/QHP: CPT | Performed by: NURSE PRACTITIONER

## 2023-11-29 NOTE — PROGRESS NOTES
Patient Consult Note - Follow Up Visit  Primary care physician: Wilfredo Travis M.D.    Reason for consult: Management of Uncontrolled Type 2 Diabetes    Time IN:  9:31am  Time OUT:  9:49am    HPI:  Julian Sellers Jr is a 68 y.o. old patient who comes in today for evaluation of above stated problem.    Most Recent HbA1c:   Lab Results   Component Value Date/Time    HBA1C 7.7 (A) 10/18/2023 08:30 AM        Current Diabetes Medication Regimen  Metformin  mg bid   GLP-1 Agent: Trulicity 4.5mg q 7 days on Saturday     Previous Diabetes Medications and Reason for Discontinuation  Metformin ER unable to tolerate 1500mg/day     Pt has home glucometer and proper testing technique -   Discussed BG Goals: FBG 80 - 130, 2hPP < 180, A1c < 7%    Before Breakfast: 140s-150s consistently, rarely as low as 110s  Before Lunch:  Before Dinner:  Before Bedtime:  Other times:  Hypoglycemia:  None    Diet/Exercise:  Diet unchanged from last visit, still having simple carb heavy foods on regular basis.  Keeping good portion control.    Exercise has diminished, was walking four to five times per week, now down to maybe twice per week.  Has increasing knee pain that he plans to have evaluated soon.    Preventative Management  BP regimen (ACEi/ARB): Lisinopril 5mg  BP <140/90: Yes  Statin: atorvastatin (Lipitor) 40 mg daily  LDL <100: Yes  Lab Results   Component Value Date/Time    CHOLSTRLTOT 99 (L) 04/11/2023 12:16 PM    LDL 35 04/11/2023 12:16 PM    HDL 43 04/11/2023 12:16 PM    TRIGLYCERIDE 105 04/11/2023 12:16 PM       Last Microalbumin/Cr:  Lab Results   Component Value Date/Time    MALBCRT 11 04/11/2023 12:16 PM    MICROALBUR 1.6 04/11/2023 12:16 PM     Last A1c:  Lab Results   Component Value Date/Time    HBA1C 7.7 (A) 10/18/2023 08:30 AM      Last Retinal Scan: 12/2022      ROS:  Constitutional: No weight loss  Cardiac: No palpitations or racing heart  Resp: No shortness of breath  Neuro: No numbness or tinging in  feet  Endo: No heat or cold intolerance, no polyuria or polydipsia  All other systems were reviewed and were negative.    Past Medical History:  Patient Active Problem List    Diagnosis Date Noted    BMI 33.0-33.9,adult 2023    Polycythemia 2023    Diabetes mellitus without complication (HCC) 2023    Mixed hyperlipidemia 2022    Essential hypertension 2022    Obesity (BMI 30.0-34.9) 2022    Agatston CAC score 100-199 2022    Insomnia 2022       Past Surgical History:  No past surgical history on file.    Allergies:  Patient has no known allergies.    Social History:  Social History     Socioeconomic History    Marital status: Single     Spouse name: Not on file    Number of children: Not on file    Years of education: Not on file    Highest education level: Not on file   Occupational History    Not on file   Tobacco Use    Smoking status: Former     Current packs/day: 0.00     Average packs/day: 0.3 packs/day for 15.0 years (3.8 ttl pk-yrs)     Types: Cigarettes     Start date:      Quit date:      Years since quittin.9    Smokeless tobacco: Never   Substance and Sexual Activity    Alcohol use: Not on file    Drug use: Not on file    Sexual activity: Not on file   Other Topics Concern    Not on file   Social History Narrative    Not on file     Social Determinants of Health     Financial Resource Strain: Not on file   Food Insecurity: Not on file   Transportation Needs: Not on file   Physical Activity: Not on file   Stress: Not on file   Social Connections: Not on file   Intimate Partner Violence: Not on file   Housing Stability: Not on file       Family History:  No family history on file.    Medications:    Current Outpatient Medications:     atorvastatin (LIPITOR) 40 MG Tab, Take 1 Tablet by mouth every day., Disp: 100 Tablet, Rfl: 3    Dulaglutide (TRULICITY) 4.5 MG/0.5ML Solution Pen-injector, Inject 1 Pen under the skin every 7 days., Disp: 2 mL,  Rfl: 12    lisinopril (PRINIVIL) 5 MG Tab, Take 1 Tablet by mouth every day., Disp: 100 Tablet, Rfl: 3    influenza Vac High-Dose Quad (FLUZONE HIGH-DOSE QUADRIVALENT) 0.7 ML Suspension Prefilled Syringe injection, Inject  into the shoulder, thigh, or buttocks., Disp: 0.7 mL, Rfl: 0    metFORMIN ER (GLUCOPHAGE XR) 500 MG TABLET SR 24 HR, Take 1 Tablet by mouth 2 times a day., Disp: , Rfl:     ONETOUCH VERIO strip, , Disp: , Rfl:     Multiple Vitamin (MULTI-VITAMINS PO), 1 tab once a day, Disp: , Rfl:     aspirin 81 MG EC tablet, one orally daily for 90 days, Disp: , Rfl:     loratadine (CLARITIN) 10 MG Tab, TAKE 1 TABLET (10 MG) BY MOUTH DAILY AS NEEDED FOR ALLERGIES, Disp: , Rfl:     Omega-3 Fatty Acids (OMEGA-3 FISH OIL PO), Take 1 Canister by mouth every day. 800 mg, Disp: , Rfl:     Labs: Reviewed    Physical Examination:  Vital signs: There were no vitals taken for this visit. There is no height or weight on file to calculate BMI.  General: No apparent distress, cooperative  Eyes: No scleral icterus or discharge  ENMT: Normal on external inspection of nose, lips, normal thyroid exam  Neck: No abnormal masses on inspection  Resp: Normal effort, clear to auscultation bilaterally   CVS: Regular rate and rhythm, S1 S2 normal, no murmur   Extremities: No edema  Abdomen: abdominal obesity present  Neuro: Alert and oriented  Skin: No rash  Psych: Normal mood and affect, intact memory and able to make informed decisions    Assessment and Plan:    Patient is optimized on Trulicity.  Taking max tolerated dose of metformin.  Home FBG have been increasing since last visit, but stable for the most part.  A1c continues to increase as well, no up to 7.7.  Patient admits to continued intake of simple carbs, but does state he keeps portions under good control.  Exercise has diminished secondary to knee issue.  He plans to have this evaluated soon.    Discussed transition to [facility] for better glucose control and higher  titration scale.  He would like to wait until after first of the year when it is more affordable.    Will plan to continue all medications for now with transition to [facility] at next visit.  Stressed importance of decreasing simple carb intake, focus more on lean protein and high fiber foods.  Increase exercise as tolerated, suggested upper body resistance work, or machines like elliptical that are lower impact on knees.    Follow Up:  Five weeks.    Thank you for allowing me to participate in the care of this patient.    Kody Pugh, PharmD, BCACP  11/29/23    CC:   Wilfredo Travis M.D.

## 2023-12-05 ENCOUNTER — APPOINTMENT (OUTPATIENT)
Dept: RADIOLOGY | Facility: IMAGING CENTER | Age: 68
End: 2023-12-05
Attending: FAMILY MEDICINE
Payer: MEDICARE

## 2023-12-05 ENCOUNTER — OFFICE VISIT (OUTPATIENT)
Dept: MEDICAL GROUP | Facility: PHYSICIAN GROUP | Age: 68
End: 2023-12-05
Payer: MEDICARE

## 2023-12-05 VITALS
HEIGHT: 68 IN | RESPIRATION RATE: 12 BRPM | WEIGHT: 220 LBS | DIASTOLIC BLOOD PRESSURE: 80 MMHG | SYSTOLIC BLOOD PRESSURE: 122 MMHG | OXYGEN SATURATION: 97 % | BODY MASS INDEX: 33.34 KG/M2 | HEART RATE: 103 BPM | TEMPERATURE: 86.6 F

## 2023-12-05 DIAGNOSIS — B35.1 TOENAIL FUNGUS: ICD-10-CM

## 2023-12-05 DIAGNOSIS — M25.562 CHRONIC PAIN OF LEFT KNEE: ICD-10-CM

## 2023-12-05 DIAGNOSIS — Z23 NEED FOR VACCINATION: ICD-10-CM

## 2023-12-05 DIAGNOSIS — G89.29 CHRONIC PAIN OF LEFT KNEE: ICD-10-CM

## 2023-12-05 PROCEDURE — 3079F DIAST BP 80-89 MM HG: CPT | Performed by: FAMILY MEDICINE

## 2023-12-05 PROCEDURE — G0009 ADMIN PNEUMOCOCCAL VACCINE: HCPCS | Performed by: FAMILY MEDICINE

## 2023-12-05 PROCEDURE — 3074F SYST BP LT 130 MM HG: CPT | Performed by: FAMILY MEDICINE

## 2023-12-05 PROCEDURE — 99214 OFFICE O/P EST MOD 30 MIN: CPT | Mod: 25 | Performed by: FAMILY MEDICINE

## 2023-12-05 PROCEDURE — 73562 X-RAY EXAM OF KNEE 3: CPT | Mod: TC,LT | Performed by: RADIOLOGY

## 2023-12-05 PROCEDURE — 90677 PCV20 VACCINE IM: CPT | Performed by: FAMILY MEDICINE

## 2023-12-05 RX ORDER — METFORMIN HYDROCHLORIDE 500 MG/1
500 TABLET, EXTENDED RELEASE ORAL 2 TIMES DAILY
Qty: 200 TABLET | Refills: 3 | Status: SHIPPED | OUTPATIENT
Start: 2023-12-05

## 2023-12-05 RX ORDER — CICLOPIROX 80 MG/ML
1 SOLUTION TOPICAL NIGHTLY
Qty: 6.6 ML | Refills: 3 | Status: SHIPPED | OUTPATIENT
Start: 2023-12-05 | End: 2024-02-15

## 2023-12-05 ASSESSMENT — ENCOUNTER SYMPTOMS
CARDIOVASCULAR NEGATIVE: 1
HEADACHES: 0
FEVER: 0
HEMOPTYSIS: 0
DOUBLE VISION: 0
PALPITATIONS: 0
MYALGIAS: 0
DEPRESSION: 0
EYES NEGATIVE: 1
NAUSEA: 0
BLURRED VISION: 0
GASTROINTESTINAL NEGATIVE: 1
CONSTITUTIONAL NEGATIVE: 1
RESPIRATORY NEGATIVE: 1
CHILLS: 0
DIZZINESS: 0
TINGLING: 0
COUGH: 0
BRUISES/BLEEDS EASILY: 0
HEARTBURN: 0
PSYCHIATRIC NEGATIVE: 1
NEUROLOGICAL NEGATIVE: 1

## 2023-12-05 ASSESSMENT — FIBROSIS 4 INDEX: FIB4 SCORE: 1.38

## 2023-12-05 NOTE — PROGRESS NOTES
Subjective     Julian Sellers Jr is a 68 y.o. male who presents with Knee Pain (Left knee X 2 months) and Nail Problem (Right toe )            1. Need for vaccination     Pneumovax Vaccine (PPSV23)    2. Chronic pain of left knee  Pain with use on lateral kneecap area   DX-KNEE 3 VIEWS Atraumatic Pain/Swelling/Deformity; Future   Referral to Orthopedics    3. Toenail fungus  Right great toe   ciclopirox (PENLAC) 8 % solution; Apply 1 Application topically every evening.  Dispense: 6.6 mL; Refill: 3    No past medical history on file.  No past surgical history on file.  Social History    Tobacco Use      Smoking status: Former        Packs/day: 0.00        Years: 0.3 packs/day for 15.0 years (3.8 ttl pk-yrs)        Types: Cigarettes        Start date:         Quit date:         Years since quittin.9      Smokeless tobacco: Never    Vaping Use      Vaping Use: Never used    Alcohol use: Yes      Comment: OCC    Drug use: Never    No family history on file.      Current Outpatient Medications: ·  ciclopirox (PENLAC) 8 % solution, Apply 1 Application topically every evening., Disp: 6.6 mL, Rfl: 3·  atorvastatin (LIPITOR) 40 MG Tab, Take 1 Tablet by mouth every day., Disp: 100 Tablet, Rfl: 3·  Dulaglutide (TRULICITY) 4.5 MG/0.5ML Solution Pen-injector, Inject 1 Pen under the skin every 7 days., Disp: 2 mL, Rfl: 12·  lisinopril (PRINIVIL) 5 MG Tab, Take 1 Tablet by mouth every day., Disp: 100 Tablet, Rfl: 3·  influenza Vac High-Dose Quad (FLUZONE HIGH-DOSE QUADRIVALENT) 0.7 ML Suspension Prefilled Syringe injection, Inject  into the shoulder, thigh, or buttocks., Disp: 0.7 mL, Rfl: 0·  metFORMIN ER (GLUCOPHAGE XR) 500 MG TABLET SR 24 HR, Take 1 Tablet by mouth 2 times a day., Disp: , Rfl: ·  ONETOUCH VERIO strip, , Disp: , Rfl: ·  Multiple Vitamin (MULTI-VITAMINS PO), 1 tab once a day, Disp: , Rfl: ·  aspirin 81 MG EC tablet, one orally daily for 90 days, Disp: , Rfl: ·  Omega-3 Fatty Acids (OMEGA-3 FISH  "OIL PO), Take 1 Canister by mouth every day. 800 mg, Disp: , Rfl: ·  loratadine (CLARITIN) 10 MG Tab, TAKE 1 TABLET (10 MG) BY MOUTH DAILY AS NEEDED FOR ALLERGIES (Patient not taking: Reported on 12/5/2023), Disp: , Rfl:     Patient was instructed on the use of medications, either prescriptions or OTC and informed on when the appropriate follow up time period should be. In addition, patient was also instructed that should any acute worsening occur that they should notify this clinic asap or call 911.              Review of Systems   Constitutional: Negative.  Negative for chills and fever.   HENT: Negative.  Negative for hearing loss.    Eyes: Negative.  Negative for blurred vision and double vision.   Respiratory: Negative.  Negative for cough and hemoptysis.    Cardiovascular: Negative.  Negative for chest pain and palpitations.   Gastrointestinal: Negative.  Negative for heartburn and nausea.   Genitourinary: Negative.  Negative for dysuria.   Musculoskeletal:  Positive for joint pain. Negative for myalgias.   Skin: Negative.  Negative for rash.   Neurological: Negative.  Negative for dizziness, tingling and headaches.   Endo/Heme/Allergies: Negative.  Does not bruise/bleed easily.   Psychiatric/Behavioral: Negative.  Negative for depression and suicidal ideas.    All other systems reviewed and are negative.             Objective     /80 (BP Location: Right arm, Patient Position: Sitting, BP Cuff Size: Adult)   Pulse (!) 103   Temp (!) 30.3 °C (86.6 °F) (Temporal)   Resp 12   Ht 1.727 m (5' 8\")   Wt 99.8 kg (220 lb)   SpO2 97%   BMI 33.45 kg/m²      Physical Exam  Vitals and nursing note reviewed.   Constitutional:       General: He is not in acute distress.     Appearance: He is well-developed. He is not diaphoretic.   HENT:      Head: Normocephalic and atraumatic.      Mouth/Throat:      Pharynx: No oropharyngeal exudate.   Eyes:      Pupils: Pupils are equal, round, and reactive to light. "   Cardiovascular:      Rate and Rhythm: Normal rate and regular rhythm.      Heart sounds: Normal heart sounds. No murmur heard.     No friction rub. No gallop.   Pulmonary:      Effort: Pulmonary effort is normal. No respiratory distress.      Breath sounds: Normal breath sounds. No wheezing or rales.   Chest:      Chest wall: No tenderness.   Musculoskeletal:      Left knee: Tenderness present over the patellar tendon.   Neurological:      Mental Status: He is alert and oriented to person, place, and time.   Psychiatric:         Behavior: Behavior normal.         Thought Content: Thought content normal.         Judgment: Judgment normal.                             Assessment & Plan        1. Need for vaccination    - Pneumovax Vaccine (PPSV23)    2. Chronic pain of left knee    - DX-KNEE 3 VIEWS Atraumatic Pain/Swelling/Deformity; Future  - Referral to Orthopedics    3. Toenail fungus    - ciclopirox (PENLAC) 8 % solution; Apply 1 Application topically every evening.  Dispense: 6.6 mL; Refill: 3

## 2024-01-03 ENCOUNTER — OFFICE VISIT (OUTPATIENT)
Dept: MEDICAL GROUP | Facility: PHYSICIAN GROUP | Age: 69
End: 2024-01-03
Payer: MEDICARE

## 2024-01-03 DIAGNOSIS — E11.9 TYPE 2 DIABETES MELLITUS WITHOUT COMPLICATION, WITHOUT LONG-TERM CURRENT USE OF INSULIN (HCC): ICD-10-CM

## 2024-01-03 PROCEDURE — 99211 OFF/OP EST MAY X REQ PHY/QHP: CPT | Performed by: NURSE PRACTITIONER

## 2024-01-03 NOTE — PROGRESS NOTES
Patient Consult Note - Follow Up Visit  Primary care physician: Wilfredo Travis M.D.    Reason for consult: Management of Uncontrolled Type 2 Diabetes    Time IN:  9:15am  Time OUT:  9:33am    HPI:  Julian Sellers Jr is a 68 y.o. old patient who comes in today for evaluation of above stated problem.    Most Recent HbA1c:   Lab Results   Component Value Date/Time    HBA1C 7.7 (A) 10/18/2023 08:30 AM        Current Diabetes Medication Regimen  Metformin  mg bid   GLP-1 Agent: Trulicity 4.5mg q 7 days on Saturday     Previous Diabetes Medications and Reason for Discontinuation  Metformin ER unable to tolerate 1500mg/day     Pt has home glucometer and proper testing technique -   Discussed BG Goals: FBG 80 - 130, 2hPP < 180, A1c < 7%    Before Breakfast: 130s per patient, improved from last visit.  Before Lunch:  Before Dinner:  Before Bedtime:  Other times:  Hypoglycemia:  None    Diet/Exercise:  Admits to worsening habits around the holidays, but feels now that he is home more, has been cooking better for himself, incorporating more vegetables and minimizing carbs a bit.  Exercise has picked back up some.  Had knee pain evaluated, found to have possible tendonitis in knee, has been doing home exercises that have helped.    Preventative Management  BP regimen (ACEi/ARB): Lisinopril 5mg  BP <140/90: Yes  Statin: atorvastatin (Lipitor) 40 mg daily  LDL <100: Yes  Lab Results   Component Value Date/Time    CHOLSTRLTOT 99 (L) 04/11/2023 12:16 PM    LDL 35 04/11/2023 12:16 PM    HDL 43 04/11/2023 12:16 PM    TRIGLYCERIDE 105 04/11/2023 12:16 PM       Last Microalbumin/Cr:  Lab Results   Component Value Date/Time    MALBCRT 11 04/11/2023 12:16 PM    MICROALBUR 1.6 04/11/2023 12:16 PM     Last A1c:  Lab Results   Component Value Date/Time    HBA1C 7.7 (A) 10/18/2023 08:30 AM      Last Retinal Scan: 12/2022    Monofilament exam: up to date, 1/2023       ROS:  Constitutional: No weight loss  Cardiac: No palpitations  or racing heart  Resp: No shortness of breath  Neuro: No numbness or tinging in feet  Endo: No heat or cold intolerance, no polyuria or polydipsia  All other systems were reviewed and were negative.    Past Medical History:  Patient Active Problem List    Diagnosis Date Noted    BMI 33.0-33.9,adult 2023    Polycythemia 2023    Diabetes mellitus without complication (HCC) 2023    Mixed hyperlipidemia 2022    Essential hypertension 2022    Obesity (BMI 30.0-34.9) 2022    Agatston CAC score 100-199 2022    Insomnia 2022       Past Surgical History:  No past surgical history on file.    Allergies:  Patient has no known allergies.    Social History:  Social History     Socioeconomic History    Marital status: Single     Spouse name: Not on file    Number of children: Not on file    Years of education: Not on file    Highest education level: Not on file   Occupational History    Not on file   Tobacco Use    Smoking status: Former     Current packs/day: 0.00     Average packs/day: 0.3 packs/day for 15.0 years (3.8 ttl pk-yrs)     Types: Cigarettes     Start date:      Quit date:      Years since quittin.0    Smokeless tobacco: Never   Vaping Use    Vaping Use: Never used   Substance and Sexual Activity    Alcohol use: Yes     Comment: OCC    Drug use: Never    Sexual activity: Not on file   Other Topics Concern    Not on file   Social History Narrative    Not on file     Social Determinants of Health     Financial Resource Strain: Not on file   Food Insecurity: Not on file   Transportation Needs: Not on file   Physical Activity: Not on file   Stress: Not on file   Social Connections: Not on file   Intimate Partner Violence: Not on file   Housing Stability: Not on file       Family History:  No family history on file.    Medications:    Current Outpatient Medications:     ciclopirox (PENLAC) 8 % solution, Apply 1 Application topically every evening., Disp: 6.6 mL,  Rfl: 3    metFORMIN ER (GLUCOPHAGE XR) 500 MG TABLET SR 24 HR, Take 1 Tablet by mouth 2 times a day., Disp: 200 Tablet, Rfl: 3    atorvastatin (LIPITOR) 40 MG Tab, Take 1 Tablet by mouth every day., Disp: 100 Tablet, Rfl: 3    Dulaglutide (TRULICITY) 4.5 MG/0.5ML Solution Pen-injector, Inject 1 Pen under the skin every 7 days., Disp: 2 mL, Rfl: 12    lisinopril (PRINIVIL) 5 MG Tab, Take 1 Tablet by mouth every day., Disp: 100 Tablet, Rfl: 3    influenza Vac High-Dose Quad (FLUZONE HIGH-DOSE QUADRIVALENT) 0.7 ML Suspension Prefilled Syringe injection, Inject  into the shoulder, thigh, or buttocks., Disp: 0.7 mL, Rfl: 0    ONETOUCH VERIO strip, , Disp: , Rfl:     Multiple Vitamin (MULTI-VITAMINS PO), 1 tab once a day, Disp: , Rfl:     aspirin 81 MG EC tablet, one orally daily for 90 days, Disp: , Rfl:     loratadine (CLARITIN) 10 MG Tab, TAKE 1 TABLET (10 MG) BY MOUTH DAILY AS NEEDED FOR ALLERGIES (Patient not taking: Reported on 12/5/2023), Disp: , Rfl:     Omega-3 Fatty Acids (OMEGA-3 FISH OIL PO), Take 1 Canister by mouth every day. 800 mg, Disp: , Rfl:     Labs: Reviewed    Physical Examination:  Vital signs: There were no vitals taken for this visit. There is no height or weight on file to calculate BMI.  General: No apparent distress, cooperative  Eyes: No scleral icterus or discharge  ENMT: Normal on external inspection of nose, lips, normal thyroid exam  Neck: No abnormal masses on inspection  Resp: Normal effort, clear to auscultation bilaterally   CVS: Regular rate and rhythm, S1 S2 normal, no murmur   Extremities: No edema  Abdomen: abdominal obesity present  Neuro: Alert and oriented  Skin: No rash  Psych: Normal mood and affect, intact memory and able to make informed decisions    Assessment and Plan:    Patient is optimized on metformin and Trulicity.  Home FBG seem to be improving from last visit, down ~10-15 points on average.  As above, admits to worsening nutrition during the holidays, but feels he  is getting better now that he is back home on regular basis.  Had knee pain evaluated, possible tendonitis, has been back to more consistent exercise and home exercises to help with pain.    Discussed transition to Mounjaro vs adding SGLT2i vs continued therapy.  Through shared decision making, decided to continue with current therapy as FBG improving and make any improvements should A1c remain elevated.    Stressed importance of maintaining appropriate nutrition and exercise habits going forward.    Follow Up:  Four weeks for A1c    Thank you for allowing me to participate in the care of this patient.    Kody Pugh, PharmD, BCACP  01/03/24    CC:   Wilfredo Travis M.D.

## 2024-01-31 ENCOUNTER — OFFICE VISIT (OUTPATIENT)
Dept: MEDICAL GROUP | Facility: PHYSICIAN GROUP | Age: 69
End: 2024-01-31
Payer: MEDICARE

## 2024-01-31 VITALS
HEART RATE: 103 BPM | OXYGEN SATURATION: 100 % | SYSTOLIC BLOOD PRESSURE: 139 MMHG | DIASTOLIC BLOOD PRESSURE: 97 MMHG | RESPIRATION RATE: 14 BRPM

## 2024-01-31 DIAGNOSIS — E11.9 TYPE 2 DIABETES MELLITUS WITHOUT COMPLICATION, WITHOUT LONG-TERM CURRENT USE OF INSULIN (HCC): ICD-10-CM

## 2024-01-31 LAB
HBA1C MFR BLD: 7.1 % (ref ?–5.8)
POCT INT CON NEG: NEGATIVE
POCT INT CON POS: POSITIVE

## 2024-01-31 PROCEDURE — 3075F SYST BP GE 130 - 139MM HG: CPT | Performed by: NURSE PRACTITIONER

## 2024-01-31 PROCEDURE — 83036 HEMOGLOBIN GLYCOSYLATED A1C: CPT | Performed by: NURSE PRACTITIONER

## 2024-01-31 PROCEDURE — 99211 OFF/OP EST MAY X REQ PHY/QHP: CPT | Performed by: NURSE PRACTITIONER

## 2024-01-31 PROCEDURE — 3080F DIAST BP >= 90 MM HG: CPT | Performed by: NURSE PRACTITIONER

## 2024-01-31 NOTE — PROGRESS NOTES
Patient Consult Note - Follow Up Visit  Primary care physician: Wilfredo Travis M.D.    Reason for consult: Management of Uncontrolled Type 2 Diabetes    Time IN:  9:21am  Time OUT:  9:41am    HPI:  Julian Sellers Jr is a 68 y.o. old patient who comes in today for evaluation of above stated problem.    Most Recent HbA1c:   Lab Results   Component Value Date/Time    HBA1C 7.1 (A) 01/31/2024 09:27 AM        Current Diabetes Medication Regimen  Metformin  mg bid   GLP-1 Agent: Trulicity 4.5mg q 7 days on Saturday     Previous Diabetes Medications and Reason for Discontinuation  Metformin ER unable to tolerate 1500mg/day     Pt has home glucometer and proper testing technique -   Discussed BG Goals: FBG 80 - 130, 2hPP < 180, A1c < 7%    Before Breakfast: maintain in mid 130s  Before Lunch:  Before Dinner:  Before Bedtime:  Other times:  Hypoglycemia:  None    Diet/Exercise:  Small improvements to nutrition habits not that holiday season has passed.  Still consumes good amount of simple carbs on regular basis.  Knee pain continues to hamper routine exercise, is only able to do walking 2-3 times per week as opposed to 3-4 times per week.    Preventative Management  BP regimen (ACEi/ARB): Lisinopril 5mg  BP <140/90: Yes  Statin: atorvastatin (Lipitor) 40 mg daily  LDL <100: Yes  Lab Results   Component Value Date/Time    CHOLSTRLTOT 99 (L) 04/11/2023 12:16 PM    LDL 35 04/11/2023 12:16 PM    HDL 43 04/11/2023 12:16 PM    TRIGLYCERIDE 105 04/11/2023 12:16 PM       Last Microalbumin/Cr:  Lab Results   Component Value Date/Time    MALBCRT 11 04/11/2023 12:16 PM    MICROALBUR 1.6 04/11/2023 12:16 PM     Last A1c:  Lab Results   Component Value Date/Time    HBA1C 7.1 (A) 01/31/2024 09:27 AM      Last Retinal Scan: 12/2022     Monofilament exam: up to date, 1/2023     ROS:  Constitutional: No weight loss  Cardiac: No palpitations or racing heart  Resp: No shortness of breath  Neuro: No numbness or tinging in  feet  Endo: No heat or cold intolerance, no polyuria or polydipsia  All other systems were reviewed and were negative.    Past Medical History:  Patient Active Problem List    Diagnosis Date Noted    BMI 33.0-33.9,adult 2023    Polycythemia 2023    Diabetes mellitus without complication (HCC) 2023    Mixed hyperlipidemia 2022    Essential hypertension 2022    Obesity (BMI 30.0-34.9) 2022    Agatston CAC score 100-199 2022    Insomnia 2022       Past Surgical History:  No past surgical history on file.    Allergies:  Patient has no known allergies.    Social History:  Social History     Socioeconomic History    Marital status: Single     Spouse name: Not on file    Number of children: Not on file    Years of education: Not on file    Highest education level: Not on file   Occupational History    Not on file   Tobacco Use    Smoking status: Former     Current packs/day: 0.00     Average packs/day: 0.3 packs/day for 15.0 years (3.8 ttl pk-yrs)     Types: Cigarettes     Start date:      Quit date:      Years since quittin.1    Smokeless tobacco: Never   Vaping Use    Vaping Use: Never used   Substance and Sexual Activity    Alcohol use: Yes     Comment: OCC    Drug use: Never    Sexual activity: Not on file   Other Topics Concern    Not on file   Social History Narrative    Not on file     Social Determinants of Health     Financial Resource Strain: Not on file   Food Insecurity: Not on file   Transportation Needs: Not on file   Physical Activity: Not on file   Stress: Not on file   Social Connections: Not on file   Intimate Partner Violence: Not on file   Housing Stability: Not on file       Family History:  No family history on file.    Medications:    Current Outpatient Medications:     ciclopirox (PENLAC) 8 % solution, Apply 1 Application topically every evening., Disp: 6.6 mL, Rfl: 3    metFORMIN ER (GLUCOPHAGE XR) 500 MG TABLET SR 24 HR, Take 1 Tablet by  mouth 2 times a day., Disp: 200 Tablet, Rfl: 3    atorvastatin (LIPITOR) 40 MG Tab, Take 1 Tablet by mouth every day., Disp: 100 Tablet, Rfl: 3    Dulaglutide (TRULICITY) 4.5 MG/0.5ML Solution Pen-injector, Inject 1 Pen under the skin every 7 days., Disp: 2 mL, Rfl: 12    lisinopril (PRINIVIL) 5 MG Tab, Take 1 Tablet by mouth every day., Disp: 100 Tablet, Rfl: 3    influenza Vac High-Dose Quad (FLUZONE HIGH-DOSE QUADRIVALENT) 0.7 ML Suspension Prefilled Syringe injection, Inject  into the shoulder, thigh, or buttocks., Disp: 0.7 mL, Rfl: 0    ONETOUCH VERIO strip, , Disp: , Rfl:     Multiple Vitamin (MULTI-VITAMINS PO), 1 tab once a day, Disp: , Rfl:     aspirin 81 MG EC tablet, one orally daily for 90 days, Disp: , Rfl:     loratadine (CLARITIN) 10 MG Tab, TAKE 1 TABLET (10 MG) BY MOUTH DAILY AS NEEDED FOR ALLERGIES (Patient not taking: Reported on 12/5/2023), Disp: , Rfl:     Omega-3 Fatty Acids (OMEGA-3 FISH OIL PO), Take 1 Canister by mouth every day. 800 mg, Disp: , Rfl:     Labs: Reviewed    Physical Examination:  Vital signs: BP (!) 139/97 (BP Location: Right arm, Patient Position: Sitting, BP Cuff Size: Large adult)   Pulse (!) 103   Resp 14   SpO2 100%  There is no height or weight on file to calculate BMI.  General: No apparent distress, cooperative  Eyes: No scleral icterus or discharge  ENMT: Normal on external inspection of nose, lips, normal thyroid exam  Neck: No abnormal masses on inspection  Resp: Normal effort, clear to auscultation bilaterally   CVS: Regular rate and rhythm, S1 S2 normal, no murmur   Extremities: No edema  Abdomen: abdominal obesity present  Neuro: Alert and oriented  Skin: No rash  Psych: Normal mood and affect, intact memory and able to make informed decisions    Assessment and Plan:    Patient optimized on Trulicity.  Taking max tolerated dose of metformin.  Home FBG remain stable, slightly above goal.  A1c has improved to 7.1%, nearly to goal.  As above, nutrition habits  have improve a touch not that holiday season has passed, but still could use improvements.  Not as much exercise with continued knee pain.  Is meeting with  this week to start on mild resistance training with machines.  Encouraged patient to make this part of daily/weekly routines going forward.    Once again discussed implementing further therapy.  Through shared decision making, decided to continue current course with improved A1c and work on increasing exercise and optimizing nutrition habits.    Will continue all current medications for now.  Decrease simple carb intake.  Increase exercise as above.    Follow Up:  Three months for A1c    Thank you for allowing me to participate in the care of this patient.    Kody Pugh, PharmD, BCACP  01/31/24    CC:   Wilfredo Travis M.D.

## 2024-02-14 NOTE — ASSESSMENT & PLAN NOTE
Chronic, stable. Associated with elevated calcium score in 2021. Maintains on statin therapy without issue. Most recent lipid panel from April 2023 WNL. Continue atorvastatin 40mg daily. Recommend Mediterranean diet and regular physical activity. Follow up with PCP at least annually for continued monitoring and management.   Lab Results   Component Value Date/Time    CHOLSTRLTOT 99 (L) 04/11/2023 12:16 PM    LDL 35 04/11/2023 12:16 PM    HDL 43 04/11/2023 12:16 PM    TRIGLYCERIDE 105 04/11/2023 12:16 PM

## 2024-02-14 NOTE — ASSESSMENT & PLAN NOTE
Chronic, improving. Last A1c 7.1 as of Jan 2024, down from 7.7 three months prior. Last monofilament foot exam: 1/2023, last urine microalb/creat: 4/2023, last retinal screening: May 2023 at [facility]. He maintains on metformin  mg twice a day and Trulcity 4.5mg once weekly. He has noted some mild neuropathy in his feet at night. Encouraged annual physical with PCP for foot exam. Continue to advise low carbohydrate diet with regular physical activity. Follow up with PCP as scheduled for continued monitoring and management.

## 2024-02-15 ENCOUNTER — OFFICE VISIT (OUTPATIENT)
Dept: MEDICAL GROUP | Facility: PHYSICIAN GROUP | Age: 69
End: 2024-02-15
Attending: FAMILY MEDICINE
Payer: MEDICARE

## 2024-02-15 VITALS
HEIGHT: 69 IN | BODY MASS INDEX: 32.09 KG/M2 | DIASTOLIC BLOOD PRESSURE: 70 MMHG | WEIGHT: 216.7 LBS | SYSTOLIC BLOOD PRESSURE: 118 MMHG

## 2024-02-15 DIAGNOSIS — E78.2 MIXED HYPERLIPIDEMIA: ICD-10-CM

## 2024-02-15 DIAGNOSIS — I10 ESSENTIAL HYPERTENSION: ICD-10-CM

## 2024-02-15 DIAGNOSIS — E11.9 DIABETES MELLITUS WITHOUT COMPLICATION (HCC): ICD-10-CM

## 2024-02-15 PROCEDURE — 3074F SYST BP LT 130 MM HG: CPT | Performed by: PHYSICIAN ASSISTANT

## 2024-02-15 PROCEDURE — 1126F AMNT PAIN NOTED NONE PRSNT: CPT | Performed by: PHYSICIAN ASSISTANT

## 2024-02-15 PROCEDURE — 3078F DIAST BP <80 MM HG: CPT | Performed by: PHYSICIAN ASSISTANT

## 2024-02-15 PROCEDURE — G0439 PPPS, SUBSEQ VISIT: HCPCS | Performed by: PHYSICIAN ASSISTANT

## 2024-02-15 SDOH — ECONOMIC STABILITY: FOOD INSECURITY: WITHIN THE PAST 12 MONTHS, THE FOOD YOU BOUGHT JUST DIDN'T LAST AND YOU DIDN'T HAVE MONEY TO GET MORE.: NEVER TRUE

## 2024-02-15 SDOH — ECONOMIC STABILITY: TRANSPORTATION INSECURITY
IN THE PAST 12 MONTHS, HAS THE LACK OF TRANSPORTATION KEPT YOU FROM MEDICAL APPOINTMENTS OR FROM GETTING MEDICATIONS?: NO

## 2024-02-15 SDOH — ECONOMIC STABILITY: FOOD INSECURITY: WITHIN THE PAST 12 MONTHS, YOU WORRIED THAT YOUR FOOD WOULD RUN OUT BEFORE YOU GOT MONEY TO BUY MORE.: NEVER TRUE

## 2024-02-15 SDOH — ECONOMIC STABILITY: TRANSPORTATION INSECURITY
IN THE PAST 12 MONTHS, HAS LACK OF TRANSPORTATION KEPT YOU FROM MEETINGS, WORK, OR FROM GETTING THINGS NEEDED FOR DAILY LIVING?: NO

## 2024-02-15 SDOH — ECONOMIC STABILITY: INCOME INSECURITY: HOW HARD IS IT FOR YOU TO PAY FOR THE VERY BASICS LIKE FOOD, HOUSING, MEDICAL CARE, AND HEATING?: NOT HARD AT ALL

## 2024-02-15 ASSESSMENT — ENCOUNTER SYMPTOMS: GENERAL WELL-BEING: GOOD

## 2024-02-15 ASSESSMENT — FIBROSIS 4 INDEX: FIB4 SCORE: 1.38

## 2024-02-15 ASSESSMENT — PATIENT HEALTH QUESTIONNAIRE - PHQ9: CLINICAL INTERPRETATION OF PHQ2 SCORE: 0

## 2024-02-15 ASSESSMENT — PAIN SCALES - GENERAL: PAINLEVEL: NO PAIN

## 2024-02-15 ASSESSMENT — ACTIVITIES OF DAILY LIVING (ADL): BATHING_REQUIRES_ASSISTANCE: 0

## 2024-02-15 NOTE — PROGRESS NOTES
Comprehensive Health Assessment Program     Julian Sellers Jr is a 68 y.o. here for his comprehensive health assessment.    Patient Active Problem List    Diagnosis Date Noted    BMI 33.0-33.9,adult 2023    Polycythemia 2023    Diabetes mellitus without complication (HCC) 2023    Mixed hyperlipidemia 2022    Essential hypertension 2022    Obesity (BMI 30.0-34.9) 2022    Agatston CAC score 100-199 2022    Insomnia 2022       Current Outpatient Medications   Medication Sig Dispense Refill    metFORMIN ER (GLUCOPHAGE XR) 500 MG TABLET SR 24 HR Take 1 Tablet by mouth 2 times a day. 200 Tablet 3    atorvastatin (LIPITOR) 40 MG Tab Take 1 Tablet by mouth every day. 100 Tablet 3    Dulaglutide (TRULICITY) 4.5 MG/0.5ML Solution Pen-injector Inject 1 Pen under the skin every 7 days. 2 mL 12    lisinopril (PRINIVIL) 5 MG Tab Take 1 Tablet by mouth every day. 100 Tablet 3    Multiple Vitamin (MULTI-VITAMINS PO) 1 tab once a day      aspirin 81 MG EC tablet one orally daily for 90 days      loratadine (CLARITIN) 10 MG Tab       Omega-3 Fatty Acids (OMEGA-3 FISH OIL PO) Take 1 Canister by mouth every day. 800 mg      ONETOUCH VERIO strip        No current facility-administered medications for this visit.          Current supplements as per medication list.     Allergies:   Patient has no known allergies.  Social History     Tobacco Use    Smoking status: Former     Current packs/day: 0.00     Average packs/day: 0.3 packs/day for 15.0 years (3.8 ttl pk-yrs)     Types: Cigarettes     Start date:      Quit date:      Years since quittin.1    Smokeless tobacco: Never   Vaping Use    Vaping Use: Never used   Substance Use Topics    Alcohol use: Not Currently     Alcohol/week: 1.8 - 2.4 oz     Types: 3 - 4 Glasses of wine per week     Comment: 3-4 drinks per week    Drug use: Never     History reviewed. No pertinent family history.  Julian  has no past medical  history on file.   History reviewed. No pertinent surgical history.    Screening:  In the last six months have you experienced any leakage of urine? No    Depression Screening  Little interest or pleasure in doing things?  0 - not at all  Feeling down, depressed , or hopeless? 0 - not at all  Patient Health Questionnaire Score: 0     If depressive symptoms identified deferred to follow up visit unless specifically addressed in assessment and plan.    Interpretation of PHQ-9 Total Score   Score Severity   1-4 No Depression   5-9 Mild Depression   10-14 Moderate Depression   15-19 Moderately Severe Depression   20-27 Severe Depression    Screening for Cognitive Impairment  Do you or any of your friends or family members have any concern about your memory? No  Three Minute Recall (Banana, Sunrise, Chair) 3/3    Brcue clock face with all 12 numbers and set the hands to show 20 past 8.  Yes 5  Cognitive concerns identified deferred for follow up unless specifically addressed in assessment and plan.    Fall Risk Assessment  Has the patient had two or more falls in the last year or any fall with injury in the last year?  No    Safety Assessment  Do you always wear your seatbelt?  Yes  Any changes to home needed to function safely? No  Difficulty hearing.  No  Patient counseled about all safety risks that were identified.    Functional Assessment ADLs  Are there any barriers preventing you from cooking for yourself or meeting nutritional needs?  No.    Are there any barriers preventing you from driving safely or obtaining transportation?  No.    Are there any barriers preventing you from using a telephone or calling for help?  No    Are there any barriers preventing you from shopping?  No.    Are there any barriers preventing you from taking care of your own finances?  No    Are there any barriers preventing you from managing your medications?  No    Are there any barriers preventing you from showering, bathing or dressing  yourself? No    Are there any barriers preventing you from doing housework or laundry? No  Are there any barriers preventing you from using the toilet?No  Are you currently engaging in any exercise or physical activity?  Yes. Treadmill 3 x a week     Self-Assessment of Health  What is your perception of your health? Good  Do you sleep more than six hours a night? Yes  In the past 7 days, how much did pain keep you from doing your normal work? None  Do you spend quality time with family or friends (virtually or in person)? Yes  Do you usually eat a heart healthy diet that constists of a variety of fruits, vegetables, whole grains and fiber? Yes  Do you eat foods high in fat and/or Fast Food more than three times per week? Yes    Advance Care Planning  Do you have an Advance Directive, Living Will, Durable Power of , or POLST? No  Provided patient with educational brochure regarding Advance Care Planning.                    Health Maintenance Summary            Overdue - Diabetes: Retinopathy Screening (Yearly) Overdue since 12/6/2023 12/06/2022  Done              Overdue - Diabetes: Monofilament / LE Exam (Yearly) Overdue since 1/11/2024 01/11/2023  Done              Postponed - COVID-19 Vaccine (5 - 2023-24 season) Postponed until 2/15/2025      06/28/2022  Imm Admin: MODERNA SARS-COV-2 VACCINE (12+)    10/11/2021  Imm Admin: MODERNA SARS-COV-2 VACCINE (12+)    03/29/2021  Imm Admin: MODERNA SARS-COV-2 VACCINE (12+)    03/01/2021  Imm Admin: MODERNA SARS-COV-2 VACCINE (12+)              Fasting Lipid Profile (Yearly) Tentatively due on 4/11/2024 04/11/2023  Lipid Profile              Diabetes: Urine Protein Screening (Yearly) Next due on 4/11/2024 04/11/2023  Microalbumin Creat Ratio Urine - Lab Collect    09/30/2021  MICROALBUM. UR RANDOM    03/14/2019  MICROALBUM. UR RANDOM    08/22/2018  MICROALBUM. UR RANDOM              SERUM CREATININE (Yearly) Next due on 4/11/2024 04/11/2023   Comp Metabolic Panel    06/18/2022  COMP METABOLIC PANEL    09/30/2021  COMP METABOLIC PANEL    10/04/2019  CREATININE    03/14/2019  CREATININE    Only the first 5 history entries have been loaded, but more history exists.              A1c Screening (Every 6 Months) Next due on 7/31/2024 01/31/2024  POCT  A1C    10/18/2023  POCT  A1C    07/19/2023  POCT  A1C    04/11/2023  Hemoglobin A1c    01/11/2023  POCT Hemoglobin A1C    Only the first 5 history entries have been loaded, but more history exists.              Annual Wellness Visit (Yearly) Next due on 2/15/2025      02/15/2024  Level of Service: ANNUAL WELLNESS VISIT-INCLUDES PPPS SUBSEQUE*    05/05/2023  Level of Service: [place] ANNUAL WELLNESS VISIT-INCLUDES PPPS SUBSEQUE*              Colorectal Cancer Screening (Colonoscopy - Every 10 Years) Tentatively due on 2/17/2026      08/15/2019  OCCULT BLOOD FECES IMMUNOASSAY    08/31/2018  OCCULT BLOOD FECES IMMUNOASSAY    02/17/2016  Colonoscopy (Reason not specified)              IMM DTaP/Tdap/Td Vaccine (6 - Td or Tdap) Next due on 6/9/2031 06/09/2021  Imm Admin: Tdap Vaccine    06/09/2021  Imm Admin: Dtap Vaccine    08/22/2018  Imm Admin: TD Vaccine    08/22/2018  Imm Admin: Tdap Vaccine    04/22/2008  Imm Admin: Tdap Vaccine    Only the first 5 history entries have been loaded, but more history exists.              Hepatitis C Screening  Tentatively Complete      03/14/2019  Hepatitis C Antibody component of HEP C VIRUS ANTIBODY              Abdominal Aortic Aneurysm (AAA) Screening  Tentatively Complete      06/25/2021  US-ABDOMEN COMPLETE SURVEY              Zoster (Shingles) Vaccines (Series Information) Completed      06/22/2022  Imm Admin: Zoster Vaccine Recombinant (RZV) (SHINGRIX)    07/06/2021  Imm Admin: Zoster Vaccine Recombinant (RZV) (SHINGRIX)    06/09/2021  Imm Admin: Zoster Vaccine Recombinant (RZV) (SHINGRIX)    06/26/2015  Imm Admin: Zoster Vaccine Live (ZVL) (Zostavax) - HISTORICAL DATA               Influenza Vaccine (Series Information) Completed      10/03/2023  Imm Admin: Influenza Vaccine Adult HD    10/19/2022  Imm Admin: Influenza Vaccine Quad Recombinant    10/19/2021  Imm Admin: Influenza, Unspecified - HISTORICAL DATA    10/07/2020  Imm Admin: Influenza Vaccine Adult HD    09/25/2019  Imm Admin: Influenza Vaccine Quad Inj (Pf)    Only the first 5 history entries have been loaded, but more history exists.              Pneumococcal Vaccine: 65+ Years (Series Information) Completed      12/05/2023  Imm Admin: Pneumococcal Conjugate Vaccine (PCV20)    10/09/2018  Imm Admin: Pneumococcal Conjugate Vaccine (Prevnar/PCV-13)    09/27/2017  Imm Admin: Pneumococcal polysaccharide vaccine (PPSV-23)              Hepatitis A Vaccine (Hep A) (Series Information) Aged Out      No completion history exists for this topic.              Hepatitis B Vaccine (Hep B) (Series Information) Aged Out      No completion history exists for this topic.              HPV Vaccines (Series Information) Aged Out      No completion history exists for this topic.              Polio Vaccine (Inactivated Polio) (Series Information) Aged Out      No completion history exists for this topic.              Meningococcal Immunization (Series Information) Aged Out      No completion history exists for this topic.                    Patient Care Team:  Wilfredo Travis M.D. as PCP - General (Family Medicine)  [facility] (Optometry)      Financial Resource Strain: Low Risk  (2/15/2024)    Overall Financial Resource Strain (CARDIA)     Difficulty of Paying Living Expenses: Not hard at all      Transportation Needs: No Transportation Needs (2/15/2024)    PRAPARE - Transportation     Lack of Transportation (Medical): No     Lack of Transportation (Non-Medical): No      Food Insecurity: No Food Insecurity (2/15/2024)    Hunger Vital Sign     Worried About Running Out of Food in the Last Year: Never true     Ran Out of Food in  "the Last Year: Never true        Encounter Vitals  Blood Pressure : 118/70  Weight: 98.3 kg (216 lb 11.2 oz)  Height: 175.3 cm (5' 9\")  BMI (Calculated): 32  Pain Score: No pain     Alert, oriented in no acute distress.  Eye contact is good, speech goal directed, affect calm.    Assessment and Plan. The following treatment and monitoring plan is recommended:  Mixed hyperlipidemia  Chronic, stable. Associated with elevated calcium score in 2021. Maintains on statin therapy without issue. Most recent lipid panel from April 2023 WNL. Continue atorvastatin 40mg daily. Recommend Mediterranean diet and regular physical activity. Follow up with PCP at least annually for continued monitoring and management.   Lab Results   Component Value Date/Time    CHOLSTRLTOT 99 (L) 04/11/2023 12:16 PM    LDL 35 04/11/2023 12:16 PM    HDL 43 04/11/2023 12:16 PM    TRIGLYCERIDE 105 04/11/2023 12:16 PM         Diabetes mellitus without complication (HCC)  Chronic, improving. Last A1c 7.1 as of Jan 2024, down from 7.7 three months prior. Last monofilament foot exam: 1/2023, last urine microalb/creat: 4/2023, last retinal screening: May 2023 at [facility]. He maintains on metformin  mg twice a day and Trulcity 4.5mg once weekly. He has noted some mild neuropathy in his feet at night. Encouraged annual physical with PCP for foot exam. Continue to advise low carbohydrate diet with regular physical activity. Follow up with PCP as scheduled for continued monitoring and management.    Essential hypertension  Chronic, stable. BP today 118/70. Denies dizziness/lightheadedness. Continue current treatment regime: lisinopril 5mg daily . Follow up with PCP annually for continued monitoring and management.     Services suggested: No services needed at this time  Health Care Screening: Age-appropriate preventive services recommended by USPTF and ACIP covered by Medicare were discussed today. Services ordered if indicated and agreed upon by the " patient.  Referrals offered: Community-based lifestyle interventions to reduce health risks and promote self-management and wellness, fall prevention, nutrition, physical activity, tobacco-use cessation, weight loss, and mental health services as per orders if indicated.    Discussion today about general wellness and lifestyle habits:    Prevent falls and reduce trip hazards; Cautioned about securing or removing rugs.  Have a working fire alarm and carbon monoxide detector.  Engage in regular physical activity and social activities.    Follow-up: Return for follow up visit with PCP as previously scheduled.

## 2024-02-15 NOTE — ASSESSMENT & PLAN NOTE
Chronic, stable. BP today 118/70. Denies dizziness/lightheadedness. Continue current treatment regime: lisinopril 5mg daily . Follow up with PCP annually for continued monitoring and management.

## 2024-03-08 ENCOUNTER — DOCUMENTATION (OUTPATIENT)
Dept: HEALTH INFORMATION MANAGEMENT | Facility: OTHER | Age: 69
End: 2024-03-08
Payer: MEDICARE

## 2024-04-26 RX ORDER — DULAGLUTIDE 4.5 MG/.5ML
1 INJECTION, SOLUTION SUBCUTANEOUS
Refills: 12 | OUTPATIENT
Start: 2024-04-26

## 2024-04-29 DIAGNOSIS — E11.9 DIABETES MELLITUS WITHOUT COMPLICATION (HCC): ICD-10-CM

## 2024-04-30 RX ORDER — DULAGLUTIDE 4.5 MG/.5ML
1 INJECTION, SOLUTION SUBCUTANEOUS
Qty: 2 ML | Refills: 12 | Status: SHIPPED | OUTPATIENT
Start: 2024-04-30

## 2024-04-30 NOTE — PROGRESS NOTES
"Patient Consult Note    TIME IN: ***  TIME OUT: ***    Primary care physician: Wilfredo Travis M.D.    Reason for consult: Management of Uncontrolled Type 2 Diabetes    HPI:  Julian Sellers Jr is a 68 y.o. old patient who comes in today for evaluation of above stated problem.    Allergies  Patient has no known allergies.    Current Diabetes Medication Regimen  Metformin ER: 500mg bid  GLP-1 or GLP-1/GIP Agent: dulaglutide (Trulicity) 4.5 mg weekly    Previous Diabetes Medications and Reason for Discontinuation  Metformin ER 1500mg/daily - unable to tolerate    Potential Barriers to Care:  Adherence: {ACTIONS; DENIES/REPORTS:98399} missed doses***  Side effects: {NONE:31018}  Affordability: ***    SMBG  Pt has home glucometer and proper testing technique - ***  Discussed BG Goals: FBG 80 - 130, 2hPP < 180, A1c < {a1c goal:25710::\"7.0%\"}    Pt reports blood sugars:   Before Breakfast: ***  Before Lunch: ***  Before Dinner: ***  Before Bedtime: ***  Other times: ***    Hypoglycemia  Hypoglycemia awareness: {YES/NO:20266}  Nocturnal hypoglycemia: {NONE:61497}  Hypoglycemia:  {NDKHYPOGLYCEMIA:20516}  Pt's treatment of Hypoglycemia  Discussed 15:15 Rule    Lifestyle  Current Exercise - ***    Dietary - ***  Breakfast - ***  Lunch - ***  Dinner - ***  Snacks - ***  Drinks - ***    Labs  Lab Results   Component Value Date/Time    HBA1C 7.1 (A) 01/31/2024 09:27 AM      Lab Results   Component Value Date/Time    SODIUM 139 04/11/2023 12:16 PM    POTASSIUM 4.2 04/11/2023 12:16 PM    CHLORIDE 103 04/11/2023 12:16 PM    CO2 23 04/11/2023 12:16 PM    GLUCOSE 106 (H) 04/11/2023 12:16 PM    BUN 16 04/11/2023 12:16 PM    CREATININE 0.83 04/11/2023 12:16 PM    GLOMRATE 86 06/18/2022 09:05 AM     Lab Results   Component Value Date/Time    ALKPHOSPHAT 61 04/11/2023 12:16 PM    ASTSGOT 12 04/11/2023 12:16 PM    ALTSGPT 12 04/11/2023 12:16 PM    TBILIRUBIN 0.9 04/11/2023 12:16 PM    ALBUMIN 4.4 04/11/2023 12:16 PM      Lab Results " "  Component Value Date/Time    CHOLSTRLTOT 99 (L) 04/11/2023 12:16 PM    LDL 35 04/11/2023 12:16 PM    HDL 43 04/11/2023 12:16 PM    TRIGLYCERIDE 105 04/11/2023 12:16 PM       Lab Results   Component Value Date/Time    MALBCRT 11 04/11/2023 12:16 PM    MICROALBUR 1.6 04/11/2023 12:16 PM       Physical Examination:  Vital signs: There were no vitals taken for this visit. There is no height or weight on file to calculate BMI.    Assessment and Plan:    1. DM2  Discussed Goals: FBG 80 - 130, 2hPP < 180, a1c < {a1c goal:90202::\"7.0%\"}  Last a1c drawn on *** was ***%, which is {goal/not:96906}  ***    - Medication changes:  ***   - Continue:  ***     - Lifestyle changes:  Exercise Goal - ***  Dietary Goal - ***    - Preventative management:  REC DM Score: ***  Care gaps addressed: ***  Care gaps updated in Health Maintenance    Follow Up:  {FOLLOWUP:40905}    Zandra Recio, PharmD    CC:   Wilfredo Travis M.D.    "

## 2024-05-01 ENCOUNTER — PATIENT MESSAGE (OUTPATIENT)
Dept: MEDICAL GROUP | Facility: PHYSICIAN GROUP | Age: 69
End: 2024-05-01

## 2024-05-01 ENCOUNTER — TELEPHONE (OUTPATIENT)
Dept: VASCULAR LAB | Facility: MEDICAL CENTER | Age: 69
End: 2024-05-01

## 2024-05-01 ENCOUNTER — APPOINTMENT (OUTPATIENT)
Dept: MEDICAL GROUP | Facility: PHYSICIAN GROUP | Age: 69
End: 2024-05-01
Payer: MEDICARE

## 2024-05-01 VITALS — BODY MASS INDEX: 32.1 KG/M2 | RESPIRATION RATE: 14 BRPM | HEIGHT: 69 IN | WEIGHT: 216.71 LBS

## 2024-05-01 DIAGNOSIS — E11.9 TYPE 2 DIABETES MELLITUS WITHOUT COMPLICATION, WITHOUT LONG-TERM CURRENT USE OF INSULIN (HCC): ICD-10-CM

## 2024-05-01 PROCEDURE — 99211 OFF/OP EST MAY X REQ PHY/QHP: CPT | Performed by: NURSE PRACTITIONER

## 2024-05-01 RX ORDER — SEMAGLUTIDE 1.34 MG/ML
1 INJECTION, SOLUTION SUBCUTANEOUS
Qty: 3 ML | Refills: 5 | Status: SHIPPED | OUTPATIENT
Start: 2024-05-01

## 2024-05-01 ASSESSMENT — FIBROSIS 4 INDEX: FIB4 SCORE: 1.38

## 2024-05-01 NOTE — Clinical Note
Raheel lanier! Can you help me coordinate mail order for pt's Ozempic? I sent it to [facility]. TY!!

## 2024-05-01 NOTE — TELEPHONE ENCOUNTER
Received New start PA request via [employer]  for Semaglutide, 1 MG/DOSE, (OZEMPIC, 1 MG/DOSE,) 4 MG/3ML Solution Pen-injector. (Quantity:9 mls, Day Supply:90)     Insurance: [employer]US   Member ID:  [id number]  BIN: [id number]  PCN: [id number]  [id number]     Ran Test claim via [employer] & medication Rejects stating prior authorization is required.    ADELE Reveles, PhT  Vascular Pharmacy Liaison (Rx Coordinator)  P: [phone]  5/1/2024 11:03 AM

## 2024-05-01 NOTE — TELEPHONE ENCOUNTER
Prior Authorization for Semaglutide, 1 MG/DOSE, (OZEMPIC, 1 MG/DOSE,) 4 MG/3ML Solution Pen-injector (Quantity: 9 mls, Days: 90) has been submitted via Cover My Meds: Key ([id number])    Insurance: [facility] PLUS     Will follow up in 24-48 business hours.     ADELE Reveles, PhT  Vascular Pharmacy Liaison (Rx Coordinator)  P: [phone]  5/1/2024 11:05 AM

## 2024-05-07 PROCEDURE — RXMED WILLOW AMBULATORY MEDICATION CHARGE: Performed by: FAMILY MEDICINE

## 2024-05-07 NOTE — TELEPHONE ENCOUNTER
Called and spoke to Pt today to offer [facility] services. Educated pt with the delivery process from the pharmacy, and obtained CC info the transaction process. Pt is aware that he will be receiving 1 month supply fill, per insurance's approval. Pt states he was on Trulicity before and it was also approved for 1 month fill at a time. Offered pharmacy liaison/ coordinator services to pt to coordinate medication delivery on his behalf. Pt verbally consented and already accepted the offer. Provided my contact information and my other Rx coordinator's phone number for future reference.     Updated pt's medication for Ozempic to be shipped out via [employer] overnight on 5/8 and will be delivered to pt's home address on 5/9. Scheduled pt's next refill call on 5/27.     ADELE Reveles, PhT  Vascular Pharmacy Liaison (Rx Coordinator)  P: [phone]  5/7/2024 11:16 AM

## 2024-05-07 NOTE — TELEPHONE ENCOUNTER
PA for Semaglutide, 1 MG/DOSE, (OZEMPIC, 1 MG/DOSE,) 4 MG/3ML Solution Pen-injector  has been denied for a quantity of 9 mls , day supply 90    Prior authorization was denied per the following:   (1) the higher dose or quantity is supported in the dosage and administration section of the 's prescribing information   (2)the higher dose or quantity is supported by one of the following medicare approved compendia    PA denial reference number: [id number]  Insurance: [employer] [facility] PLUS     Ran a live test claim via [facility] pharmacy system and it appears that pt is approved for 1 month supply per fill. Per insurance, copay is $47/30DS.     Next Steps:will outreach to Pt to offer pharmacy services and or release medication to pharmacy of choice.     ADELE Reveles, PhT  Vascular Pharmacy Liaison (Rx Coordinator)  P: [phone]  5/7/2024 10:48 AM

## 2024-05-08 ENCOUNTER — PHARMACY VISIT (OUTPATIENT)
Dept: PHARMACY | Facility: MEDICAL CENTER | Age: 69
End: 2024-05-08
Payer: COMMERCIAL

## 2024-06-07 NOTE — TELEPHONE ENCOUNTER
Contact:  Phone number:[phone] (mobile)    Name of person spoken with and relationship to patient: self   Patient’s Adherence:  How patient is doing on medication: Very Well    How many missed doses and reason: 0 N/A    Any new medications: no    Any new conditions: no    Any new allergies: no    Any new side effects: no    Any new diagnoses: no    How many doses remainin    Did patient want to speak with pharmacist: No   Delivery:  Delivery date and method: 2024 via fedex overnight     Next Injection Date: 06/10/2024    Signature required: No     Any additional details for :  leave at front door    Teach Appointment Date:  N/A   Shipping Address:  [address] [place] [address]   Medication(name,strength and dose):  Semaglutide, 1 MG/DOSE, (OZEMPIC, 1 MG/DOSE,) 4 MG/3ML Solution Pen-injector   Copay:  $301.30    Payment Method:  Credit card on file   Supplies:  Alcohol swabs    Additional Information:  Received EMR from the [facility] lynne that pt can be covered for up to $400 of the total expense. Called and spoke to Pt today in regards to the approval status to help him in getting assisted for medication cost for his Ozempic. Pt will take the responsible pay for the remaining balance of the medication. Pt also states that he is okay to receive his medication on Monday. Pt also reports that he does his injections on Monday morning, and should be fine if he receives it a little bit later in the day. However, I received a call from the pharmacy stating that they would need to push the delivery date to 6/10 and have the medication to be delivered on . Called pt back and unable to reach him at this time. Left a voicemail in regards to the delivery date status for the medication. Updated pt's next refill call reminder to .        ADELE Reveles, PhT  Vascular Pharmacy Liaison (Rx Coordinator)  P: [phone]  2024 11:14 AM

## 2024-06-10 ENCOUNTER — PHARMACY VISIT (OUTPATIENT)
Dept: PHARMACY | Facility: MEDICAL CENTER | Age: 69
End: 2024-06-10
Payer: COMMERCIAL

## 2024-06-10 PROCEDURE — RXMED WILLOW AMBULATORY MEDICATION CHARGE: Performed by: FAMILY MEDICINE

## 2024-06-12 ENCOUNTER — PHARMACY VISIT (OUTPATIENT)
Dept: PHARMACY | Facility: MEDICAL CENTER | Age: 69
End: 2024-06-12
Payer: COMMERCIAL

## 2024-07-24 ENCOUNTER — HOSPITAL ENCOUNTER (OUTPATIENT)
Dept: LAB | Facility: MEDICAL CENTER | Age: 69
End: 2024-07-24
Attending: FAMILY MEDICINE
Payer: MEDICARE

## 2024-07-24 DIAGNOSIS — E11.9 TYPE 2 DIABETES MELLITUS WITHOUT COMPLICATION, WITHOUT LONG-TERM CURRENT USE OF INSULIN (HCC): ICD-10-CM

## 2024-07-24 LAB
ALBUMIN SERPL BCP-MCNC: 4.5 G/DL (ref 3.2–4.9)
ALBUMIN/GLOB SERPL: 1.4 G/DL
ALP SERPL-CCNC: 79 U/L (ref 30–99)
ALT SERPL-CCNC: 18 U/L (ref 2–50)
ANION GAP SERPL CALC-SCNC: 12 MMOL/L (ref 7–16)
AST SERPL-CCNC: 17 U/L (ref 12–45)
BILIRUB SERPL-MCNC: 1.2 MG/DL (ref 0.1–1.5)
BUN SERPL-MCNC: 13 MG/DL (ref 8–22)
CALCIUM ALBUM COR SERPL-MCNC: 9 MG/DL (ref 8.5–10.5)
CALCIUM SERPL-MCNC: 9.4 MG/DL (ref 8.4–10.2)
CHLORIDE SERPL-SCNC: 103 MMOL/L (ref 96–112)
CHOLEST SERPL-MCNC: 103 MG/DL (ref 100–199)
CO2 SERPL-SCNC: 25 MMOL/L (ref 20–33)
CREAT SERPL-MCNC: 0.88 MG/DL (ref 0.5–1.4)
CREAT UR-MCNC: 206.96 MG/DL
FASTING STATUS PATIENT QL REPORTED: NORMAL
GFR SERPLBLD CREATININE-BSD FMLA CKD-EPI: 93 ML/MIN/1.73 M 2
GLOBULIN SER CALC-MCNC: 3.3 G/DL (ref 1.9–3.5)
GLUCOSE SERPL-MCNC: 167 MG/DL (ref 65–99)
HDLC SERPL-MCNC: 50 MG/DL
LDLC SERPL CALC-MCNC: 30 MG/DL
MICROALBUMIN UR-MCNC: 3.1 MG/DL
MICROALBUMIN/CREAT UR: 15 MG/G (ref 0–30)
POTASSIUM SERPL-SCNC: 4.7 MMOL/L (ref 3.6–5.5)
PROT SERPL-MCNC: 7.8 G/DL (ref 6–8.2)
SODIUM SERPL-SCNC: 140 MMOL/L (ref 135–145)
TRIGL SERPL-MCNC: 113 MG/DL (ref 0–149)

## 2024-07-24 PROCEDURE — 82570 ASSAY OF URINE CREATININE: CPT

## 2024-07-24 PROCEDURE — 80061 LIPID PANEL: CPT

## 2024-07-24 PROCEDURE — 80053 COMPREHEN METABOLIC PANEL: CPT

## 2024-07-24 PROCEDURE — 82043 UR ALBUMIN QUANTITATIVE: CPT

## 2024-07-24 PROCEDURE — 36415 COLL VENOUS BLD VENIPUNCTURE: CPT

## 2024-08-07 ENCOUNTER — OFFICE VISIT (OUTPATIENT)
Dept: MEDICAL GROUP | Facility: PHYSICIAN GROUP | Age: 69
End: 2024-08-07
Payer: MEDICARE

## 2024-08-07 DIAGNOSIS — E11.9 DIABETES MELLITUS WITHOUT COMPLICATION (HCC): ICD-10-CM

## 2024-08-07 LAB
HBA1C MFR BLD: 7.4 % (ref ?–5.8)
POCT INT CON NEG: NEGATIVE
POCT INT CON POS: POSITIVE

## 2024-08-07 PROCEDURE — 83036 HEMOGLOBIN GLYCOSYLATED A1C: CPT

## 2024-08-07 PROCEDURE — 99211 OFF/OP EST MAY X REQ PHY/QHP: CPT | Performed by: NURSE PRACTITIONER

## 2024-08-07 RX ORDER — SEMAGLUTIDE 2.68 MG/ML
2 INJECTION, SOLUTION SUBCUTANEOUS
Qty: 3 ML | Refills: 5 | Status: SHIPPED | OUTPATIENT
Start: 2024-08-07

## 2024-08-07 NOTE — PROGRESS NOTES
Patient Consult Note    TIME IN: 9:20 am  TIME OUT: 9:50 am    Primary care physician: Wilfredo Travis M.D.    Reason for consult: Management of Uncontrolled Type 2 Diabetes    HPI:  Julian Sellers Jr is a 69 y.o. old patient who comes in today for evaluation of above stated problem.    Allergies  Patient has no known allergies.    Current Diabetes Medication Regimen  Metformin ER: 500 mg BID  GLP-1 or GLP-1/GIP Analog: semaglutide (Ozempic) 1 mg weekly    Previous Diabetes Medications and Reason for Discontinuation  Metformin ER 1500 mg/daily - unable to tolerate (was splitting ER tabs)    Potential Barriers to Care:  Adherence: reports missed doses maybe once per month   Side effects: none  Affordability: Ozempic is expensive, but pt using lynne to make it more affordable    SMBG  Pt has home glucometer and proper testing technique - Yes  Discussed BG Goals: FBG 80 - 130, 2hPP < 180, A1c < 7%    Pt reports blood sugars:   Before Breakfast: 110-120's, sometimes > 130, 126 this AM    Hypoglycemia  Hypoglycemia awareness: Yes  Nocturnal hypoglycemia: None  Hypoglycemia:  None  Pt's treatment of Hypoglycemia  Discussed 15:15 Rule    Lifestyle  Current Exercise - Walking 3-4x/week for 40 min.  Exercise Goal - Increase as tolerated     Dietary -   Breakfast - sausage ashley and orange, varies. Eggs, english muffin  Lunch - Burgers, sandwiches  Dinner - Cooks at home. Vegetable and protein  Snacks - raw almonds, popcorn  Drinks - water, diet Coke  Desserts - Cookies (animal crackers) once daily as a snack    Preventative Management  BP regimen (ACEi/ARB): Lisinopril 5 mg once daily   Statin: atorvastatin (Lipitor) 40 mg daily  Last Microalbumin/Cr:  Lab Results   Component Value Date/Time    MALBCRT 15 07/24/2024 08:54 AM    MICROALBUR 3.1 07/24/2024 08:54 AM     Last A1c:  Lab Results   Component Value Date/Time    HBA1C 7.4 (A) 08/07/2024 09:38 AM      Last Retinal Scan: Completed 5/2024     Monofilament exam:  Address at f/u visit     Labs  Lab Results   Component Value Date/Time    SODIUM 140 07/24/2024 08:54 AM    POTASSIUM 4.7 07/24/2024 08:54 AM    CHLORIDE 103 07/24/2024 08:54 AM    CO2 25 07/24/2024 08:54 AM    GLUCOSE 167 (H) 07/24/2024 08:54 AM    BUN 13 07/24/2024 08:54 AM    CREATININE 0.88 07/24/2024 08:54 AM    GLOMRATE 86 06/18/2022 09:05 AM     Lab Results   Component Value Date/Time    ALKPHOSPHAT 79 07/24/2024 08:54 AM    ASTSGOT 17 07/24/2024 08:54 AM    ALTSGPT 18 07/24/2024 08:54 AM    TBILIRUBIN 1.2 07/24/2024 08:54 AM    ALBUMIN 4.5 07/24/2024 08:54 AM        Physical Examination:  Vital signs: There were no vitals taken for this visit. There is no height or weight on file to calculate BMI.    Assessment and Plan:    1. DM2  Pt presents to clinic doing well since last visit. He was able to transition from Trulicity to Ozempic w/ no issues. He is working w/ our Rx Coordinator for [employer] assistance for Ozempic d/t being in coverage gap.  Discussed Goals: FBG 80 - 130, 2hPP < 180, a1c < 7%   Most recent a1c drawn today was 7.4%, which is not at goal and increased from last (7.1% on 1/2024).  Pt reported FBG are at goal currently.  D/w pt optimizing his metformin vs GLP1 - he opts to increase Ozempic dose today. May consider re-challenge of increased metformin dose in the future if additional glycemic control is warranted (pt amenable).     - Medication changes:  INCREASE Ozempic up to 2 mg subQ once weekly   - Continue:  Metformin 500 mg BID     - Lifestyle changes:  Diet: Maximize lean proteins and veggies. Cut out/down on carbs. Avoid simple sugars.   Exercise: Increase as tolerated    - Preventative management:  REC DM Score: 0   Care gaps addressed: N/a  Care gaps updated in Health Maintenance    Follow Up:  ~ 6 weeks    Bala López, PharmD, BCACP    CC:   Wilfredo Travis M.D.

## 2024-08-12 PROCEDURE — RXMED WILLOW AMBULATORY MEDICATION CHARGE: Performed by: FAMILY MEDICINE

## 2024-08-13 ENCOUNTER — PHARMACY VISIT (OUTPATIENT)
Dept: PHARMACY | Facility: MEDICAL CENTER | Age: 69
End: 2024-08-13
Payer: COMMERCIAL

## 2024-09-10 PROCEDURE — RXMED WILLOW AMBULATORY MEDICATION CHARGE: Performed by: FAMILY MEDICINE

## 2024-09-18 ENCOUNTER — PHARMACY VISIT (OUTPATIENT)
Dept: PHARMACY | Facility: MEDICAL CENTER | Age: 69
End: 2024-09-18
Payer: COMMERCIAL

## 2024-09-25 ENCOUNTER — OFFICE VISIT (OUTPATIENT)
Dept: MEDICAL GROUP | Facility: PHYSICIAN GROUP | Age: 69
End: 2024-09-25
Payer: MEDICARE

## 2024-09-25 DIAGNOSIS — E11.9 TYPE 2 DIABETES MELLITUS WITHOUT COMPLICATION, WITHOUT LONG-TERM CURRENT USE OF INSULIN (HCC): ICD-10-CM

## 2024-09-25 PROCEDURE — 99211 OFF/OP EST MAY X REQ PHY/QHP: CPT | Performed by: STUDENT IN AN ORGANIZED HEALTH CARE EDUCATION/TRAINING PROGRAM

## 2024-09-25 NOTE — PROGRESS NOTES
Patient Consult Note    TIME IN: 9:50 am  TIME OUT: 10:21 am    Primary care physician: Wilfredo Travis M.D.    Reason for consult: Management of Uncontrolled Type 2 Diabetes    HPI:  Julian Sellers Jr is a 69 y.o. old patient who comes in today for evaluation of above stated problem.    Allergies  Patient has no known allergies.    Current Diabetes Medication Regimen  Metformin ER: 500 mg BID  GLP-1 or GLP-1/GIP Analog: semaglutide (Ozempic) 2 mg weekly    Previous Diabetes Medications and Reason for Discontinuation  Metformin ER 1500 mg/daily - unable to tolerate (was splitting ER tabs)    Potential Barriers to Care:  Adherence: denies missed doses.   Side effects: reports some diarrhea the day after Ozempic injections, but this is not worrisome to him.   Affordability: Ozempic is expensive, but pt using lynne to make it more affordable. Reports that affordability is okay at this time.     SMBG  Pt has home glucometer and proper testing technique - Yes  Discussed BG Goals: FBG 80 - 130, 2hPP < 180, A1c < 7%    Pt reports blood sugars:   Before Breakfast: 110-130's, sometimes > 130 - reports that it depends on carb content of evening meal.     Hypoglycemia  Hypoglycemia awareness: Yes  Nocturnal hypoglycemia: None  Hypoglycemia:  None  Pt's treatment of Hypoglycemia  Discussed 15:15 Rule    Lifestyle  Current Exercise - Walking 3-4x/week for 40 min. Reports that during winter he walks on treadmill and gets heart rates up above 100 bpm regularly.   Did sprain his ankle a few weeks ago which has limited Exercise.   Exercise Goal - Increase as tolerated     Dietary - Eating approximately 2 meals/day after starting GLP1  Breakfast - sausage ashley and orange, varies. Eggs, english muffin, occasional cereal (shredded wheat or Cheerios with banana)  Lunch - Burgers, sandwiches (1/2 the time fast food, 1/2 the time at home)  Dinner - Cooks at home. Vegetable and protein  Snacks - raw almonds, popcorn  Drinks - water,  diet Coke, iced tea w/ artificial sweetener  Desserts - Cookies (animal crackers) once daily as a snack (handful)     Preventative Management  BP regimen (ACEi/ARB): Lisinopril 5 mg once daily   Statin: atorvastatin (Lipitor) 40 mg daily  Last Microalbumin/Cr:  Lab Results   Component Value Date/Time    MALBCRT 15 07/24/2024 08:54 AM    MICROALBUR 3.1 07/24/2024 08:54 AM     Last A1c:  Lab Results   Component Value Date/Time    HBA1C 7.4 (A) 08/07/2024 09:38 AM      Last Retinal Scan: Completed 5/2024     Monofilament exam: Address at f/u visit     Labs  Lab Results   Component Value Date/Time    SODIUM 140 07/24/2024 08:54 AM    POTASSIUM 4.7 07/24/2024 08:54 AM    CHLORIDE 103 07/24/2024 08:54 AM    CO2 25 07/24/2024 08:54 AM    GLUCOSE 167 (H) 07/24/2024 08:54 AM    BUN 13 07/24/2024 08:54 AM    CREATININE 0.88 07/24/2024 08:54 AM    GLOMRATE 86 06/18/2022 09:05 AM     Lab Results   Component Value Date/Time    ALKPHOSPHAT 79 07/24/2024 08:54 AM    ASTSGOT 17 07/24/2024 08:54 AM    ALTSGPT 18 07/24/2024 08:54 AM    TBILIRUBIN 1.2 07/24/2024 08:54 AM    ALBUMIN 4.5 07/24/2024 08:54 AM        Physical Examination:  Vital signs: There were no vitals taken for this visit. There is no height or weight on file to calculate BMI.    Assessment and Plan:    1. DM2  Pt presents to clinic doing well since last visit. He was able to tolerate increased Ozempic dose w/ no issues. He is working w/ our Rx Coordinator for lynne assistance for Ozempic d/t being in coverage gap.  Discussed Goals: FBG 80 - 130, 2hPP < 180, a1c < 7%   Most recent a1c drawn on 8/7/24 was 7.4%, which is not at goal and increased from last (7.1% on 1/2024).  Pt reported FBG are at goal currently, they have trended upward since last appointment. Pt attributes this to eating more carbs with dinner and sprained ankle reducing mobility.   May consider re-challenge of increased metformin dose in the future if additional glycemic control is warranted, pt is  amenable, but would like to wait and see next A1c result prior to increasing metformin dose.     - Medication changes:  None  - Continue:  Metformin 500 mg BID   Ozempic 2mg SQ weekly    - Lifestyle changes:  Diet: Maximize lean proteins and veggies. Cut out/down on carbs. Avoid simple sugars.   Discussed plate method and strategies to reduce carb content of meals (open-faced sandwiches/removing bottom bun of hamburger, etc.)  Limit carb content of evening meals  Exercise: Increase as tolerated  Focus on increasing HR during walks    - Preventative management:  REC DM Score: 0   Care gaps addressed: N/a  Care gaps updated in Health Maintenance    Follow Up:   6 weeks for repeat A1c and consideration of up-titration of metformin.     Paul Christy, PharmD    CC:   Wilfredo Travis M.D.  Yahir Venegas, PharmD  Dr. Smith

## 2024-10-11 PROCEDURE — RXMED WILLOW AMBULATORY MEDICATION CHARGE: Performed by: FAMILY MEDICINE

## 2024-10-14 ENCOUNTER — PHARMACY VISIT (OUTPATIENT)
Dept: PHARMACY | Facility: MEDICAL CENTER | Age: 69
End: 2024-10-14
Payer: COMMERCIAL

## 2024-11-08 PROCEDURE — RXMED WILLOW AMBULATORY MEDICATION CHARGE: Performed by: FAMILY MEDICINE

## 2024-11-12 ENCOUNTER — PHARMACY VISIT (OUTPATIENT)
Dept: PHARMACY | Facility: MEDICAL CENTER | Age: 69
End: 2024-11-12
Payer: COMMERCIAL

## 2024-11-13 ENCOUNTER — OFFICE VISIT (OUTPATIENT)
Dept: MEDICAL GROUP | Facility: PHYSICIAN GROUP | Age: 69
End: 2024-11-13
Payer: MEDICARE

## 2024-11-13 VITALS — SYSTOLIC BLOOD PRESSURE: 125 MMHG | BODY MASS INDEX: 31.66 KG/M2 | DIASTOLIC BLOOD PRESSURE: 93 MMHG | WEIGHT: 214.4 LBS

## 2024-11-13 DIAGNOSIS — E11.9 TYPE 2 DIABETES MELLITUS WITHOUT COMPLICATION, WITHOUT LONG-TERM CURRENT USE OF INSULIN (HCC): ICD-10-CM

## 2024-11-13 LAB
HBA1C MFR BLD: 6.7 % (ref ?–5.8)
POCT INT CON NEG: NEGATIVE
POCT INT CON POS: POSITIVE

## 2024-11-13 PROCEDURE — 83036 HEMOGLOBIN GLYCOSYLATED A1C: CPT

## 2024-11-13 PROCEDURE — 99211 OFF/OP EST MAY X REQ PHY/QHP: CPT

## 2024-11-13 RX ORDER — BLOOD SUGAR DIAGNOSTIC
1 STRIP MISCELLANEOUS DAILY
Qty: 100 STRIP | Refills: 3 | Status: SHIPPED | OUTPATIENT
Start: 2024-11-13

## 2024-11-13 NOTE — PROGRESS NOTES
Patient Consult Note    TIME IN: 9:10 am  TIME OUT: 9:29 am    Primary care physician: Wilfredo Travis M.D.    Reason for consult: Management of Uncontrolled Type 2 Diabetes    HPI:  Julian Sellers Jr is a 69 y.o. old patient who comes in today for evaluation of above stated problem.    Allergies  Patient has no known allergies.    Current Diabetes Medication Regimen  Metformin ER: 500 mg BID  GLP-1 or GLP-1/GIP Analog: semaglutide (Ozempic) 2 mg weekly    Previous Diabetes Medications and Reason for Discontinuation  Metformin ER 1500 mg/daily - unable to tolerate (was splitting ER tabs)    Potential Barriers to Care:  Adherence: denies missed doses.   Side effects: No issues   Affordability: Can afford Ozempic for now    SMBG  Pt has home glucometer and proper testing technique - Yes  Discussed BG Goals: FBG 80 - 130, 2hPP < 180, A1c < 7%    Pt reports blood sugars:   Before Breakfast: 129-143, one time 170 from spaghetti/garlic bread dinner    Hypoglycemia  Hypoglycemia awareness: Yes  Nocturnal hypoglycemia: None  Hypoglycemia:  None  Pt's treatment of Hypoglycemia  Discussed 15:15 Rule    Lifestyle  Current Exercise - Walking 3-4x/week for 40 min. Reports that during winter he walks on treadmill and gets heart rates up above 100 bpm regularly.   Exercise Goal - Increase as tolerated, continue to exercise during winter     Dietary - Eating approximately 2 meals/day after starting GLP1  Brunch - Cereal, egg, zaidi  Dinner - Cooks at home. Vegetable and protein  Snacks - raw almonds, popcorn  Drinks - water, diet Coke, iced tea w/ artificial sweetener  Desserts - Cookies (animal crackers) once daily as a snack (handful)     Preventative Management  BP regimen (ACEi/ARB): Lisinopril 5 mg once daily   Statin: atorvastatin (Lipitor) 40 mg daily  Last Microalbumin/Cr:  Lab Results   Component Value Date/Time    MALBCRT 15 07/24/2024 08:54 AM    MICROALBUR 3.1 07/24/2024 08:54 AM     Last A1c:  Lab Results    Component Value Date/Time    HBA1C 6.7 (A) 11/13/2024 09:20 AM      Last Retinal Scan: Completed 5/2024     Monofilament exam: Will address at next visit    Labs  Lab Results   Component Value Date/Time    SODIUM 140 07/24/2024 08:54 AM    POTASSIUM 4.7 07/24/2024 08:54 AM    CHLORIDE 103 07/24/2024 08:54 AM    CO2 25 07/24/2024 08:54 AM    GLUCOSE 167 (H) 07/24/2024 08:54 AM    BUN 13 07/24/2024 08:54 AM    CREATININE 0.88 07/24/2024 08:54 AM    GLOMRATE 86 06/18/2022 09:05 AM     Lab Results   Component Value Date/Time    ALKPHOSPHAT 79 07/24/2024 08:54 AM    ASTSGOT 17 07/24/2024 08:54 AM    ALTSGPT 18 07/24/2024 08:54 AM    TBILIRUBIN 1.2 07/24/2024 08:54 AM    ALBUMIN 4.5 07/24/2024 08:54 AM        Physical Examination:  Vital signs: BP (!) 125/93   Wt 97.3 kg (214 lb 6.4 oz)   BMI 31.66 kg/m²  Body mass index is 31.66 kg/m².    Assessment and Plan:    1. DM2  Discussed Goals: FBG 80 - 130, 2hPP < 180, a1c < 7%   Most recent a1c drawn today was 6.7%, which is not at goal and decreased from last (6.7% on 8/7/24).  Pt reported FBG are slightly above goal, but current A1c at goal. Discussed with patient appropriate lifestyle measures to help support glycemic control.     - Medication changes:  None  - Continue:  Metformin 500 mg BID   Ozempic 2mg SQ weekly    - Lifestyle changes:  Diet: Maximize lean proteins and veggies. Cut out/down on carbs. Avoid simple sugars.   Exercise: Increase as tolerated  Focus on increasing HR during walks    - Preventative management:  REC DM Score: 0   Care gaps addressed: N/a  Care gaps updated in Health Maintenance    Follow Up:  3 months for A1c    Alice Georges, Sussy    CC:   Wilfredo Travis M.D.

## 2024-11-30 DIAGNOSIS — E11.9 DIABETES MELLITUS WITHOUT COMPLICATION (HCC): ICD-10-CM

## 2024-11-30 DIAGNOSIS — E11.9 TYPE 2 DIABETES MELLITUS WITHOUT COMPLICATION, WITHOUT LONG-TERM CURRENT USE OF INSULIN (HCC): ICD-10-CM

## 2024-12-02 RX ORDER — ATORVASTATIN CALCIUM 40 MG/1
40 TABLET, FILM COATED ORAL
Qty: 100 TABLET | Refills: 3 | Status: SHIPPED | OUTPATIENT
Start: 2024-12-02

## 2024-12-02 RX ORDER — LISINOPRIL 5 MG/1
5 TABLET ORAL
Qty: 100 TABLET | Refills: 3 | Status: SHIPPED | OUTPATIENT
Start: 2024-12-02

## 2024-12-02 NOTE — TELEPHONE ENCOUNTER
Received request via: Pharmacy    Was the patient seen in the last year in this department? Yes    Does the patient have an active prescription (recently filled or refills available) for medication(s) requested? No    Ph[facility] Name: [facility]     Does the patient have alf Plus and need 100-day supply? (This applies to ALL medications) Yes, quantity updated to 100 days

## 2024-12-03 PROCEDURE — RXMED WILLOW AMBULATORY MEDICATION CHARGE: Performed by: FAMILY MEDICINE

## 2024-12-10 ENCOUNTER — PHARMACY VISIT (OUTPATIENT)
Dept: PHARMACY | Facility: MEDICAL CENTER | Age: 69
End: 2024-12-10
Payer: COMMERCIAL

## 2025-01-04 PROCEDURE — RXMED WILLOW AMBULATORY MEDICATION CHARGE: Performed by: FAMILY MEDICINE

## 2025-01-06 ENCOUNTER — PHARMACY VISIT (OUTPATIENT)
Dept: PHARMACY | Facility: MEDICAL CENTER | Age: 70
End: 2025-01-06
Payer: COMMERCIAL

## 2025-01-13 RX ORDER — METFORMIN HYDROCHLORIDE 500 MG/1
500 TABLET, EXTENDED RELEASE ORAL 2 TIMES DAILY
Qty: 200 TABLET | Refills: 3 | Status: SHIPPED | OUTPATIENT
Start: 2025-01-13

## 2025-01-31 DIAGNOSIS — E11.9 DIABETES MELLITUS WITHOUT COMPLICATION (HCC): ICD-10-CM

## 2025-01-31 PROCEDURE — RXMED WILLOW AMBULATORY MEDICATION CHARGE: Performed by: FAMILY MEDICINE

## 2025-01-31 RX ORDER — SEMAGLUTIDE 2.68 MG/ML
2 INJECTION, SOLUTION SUBCUTANEOUS
Qty: 3 ML | Refills: 5 | Status: SHIPPED | OUTPATIENT
Start: 2025-01-31

## 2025-02-04 ENCOUNTER — PHARMACY VISIT (OUTPATIENT)
Dept: PHARMACY | Facility: MEDICAL CENTER | Age: 70
End: 2025-02-04
Payer: COMMERCIAL

## 2025-02-12 ENCOUNTER — OFFICE VISIT (OUTPATIENT)
Dept: MEDICAL GROUP | Facility: PHYSICIAN GROUP | Age: 70
End: 2025-02-12
Payer: MEDICARE

## 2025-02-12 VITALS — WEIGHT: 214 LBS | BODY MASS INDEX: 31.7 KG/M2 | RESPIRATION RATE: 14 BRPM | HEIGHT: 69 IN

## 2025-02-12 DIAGNOSIS — E11.9 TYPE 2 DIABETES MELLITUS WITHOUT COMPLICATION, WITHOUT LONG-TERM CURRENT USE OF INSULIN (HCC): ICD-10-CM

## 2025-02-12 LAB
HBA1C MFR BLD: 6.6 % (ref ?–5.8)
POCT INT CON NEG: NEGATIVE
POCT INT CON POS: POSITIVE

## 2025-02-12 PROCEDURE — 83036 HEMOGLOBIN GLYCOSYLATED A1C: CPT | Performed by: FAMILY MEDICINE

## 2025-02-12 PROCEDURE — 99211 OFF/OP EST MAY X REQ PHY/QHP: CPT | Performed by: FAMILY MEDICINE

## 2025-02-12 NOTE — PROGRESS NOTES
Patient Consult Note    TIME IN: 8:57 am  TIME OUT: 9:17 am    Primary care physician: Wilfredo Travis M.D.    Reason for consult: Management of Uncontrolled Type 2 Diabetes    HPI:  Julian Sellers Jr is a 69 y.o. old patient who comes in today for evaluation of above stated problem.    Allergies  Patient has no known allergies.    Current Diabetes Medication Regimen  Metformin ER: 500 mg BID  GLP-1 or GLP-1/GIP Analog: semaglutide (Ozempic) 2 mg weekly    Previous Diabetes Medications and Reason for Discontinuation  Metformin ER 1500 mg/daily - unable to tolerate (was splitting ER tabs)    Potential Barriers to Care:  Adherence: denies missed doses.   Side effects: No issues   Affordability: Can afford Ozempic for now    SMBG  Pt has home glucometer and proper testing technique - Yes  Discussed BG Goals: FBG 80 - 130, 2hPP < 180, A1c < 7%    Pt reports blood sugars:   Before Breakfast: 130s-140s     Hypoglycemia  Hypoglycemia awareness: Yes  Nocturnal hypoglycemia: None  Hypoglycemia:  None  Pt's treatment of Hypoglycemia  Discussed 15:15 Rule    Lifestyle  Current Exercise - Walking 3-4x/week for 40 min. Reports that during winter he walks on treadmill and gets heart rates up above 100 bpm regularly.   Exercise Goal - Increase as tolerated, continue to exercise during winter     Dietary - Eating approximately 2 meals/day after starting GLP1  Brunch - Cereal, egg, zaidi  Dinner - Cooks at home. Vegetable and protein  Snacks - raw almonds, popcorn  Drinks - water, diet Coke, iced tea w/ artificial sweetener  Desserts - Cookies (animal crackers) once daily as a snack (handful) OR low-sugar/sugar free pudding    Preventative Management  BP regimen (ACEi/ARB): Lisinopril 5 mg once daily   Statin: atorvastatin (Lipitor) 40 mg daily  Last Microalbumin/Cr:  Lab Results   Component Value Date/Time    MALBCRT 15 07/24/2024 08:54 AM    MICROALBUR 3.1 07/24/2024 08:54 AM     Last A1c:  Lab Results   Component Value  "Date/Time    HBA1C 6.6 (A) 02/12/2025 09:09 AM      Last Retinal Scan: Completed 5/2024     Monofilament exam: Will address at next visit    Labs  Lab Results   Component Value Date/Time    SODIUM 140 07/24/2024 08:54 AM    POTASSIUM 4.7 07/24/2024 08:54 AM    CHLORIDE 103 07/24/2024 08:54 AM    CO2 25 07/24/2024 08:54 AM    GLUCOSE 167 (H) 07/24/2024 08:54 AM    BUN 13 07/24/2024 08:54 AM    CREATININE 0.88 07/24/2024 08:54 AM    GLOMRATE 86 06/18/2022 09:05 AM     Lab Results   Component Value Date/Time    ALKPHOSPHAT 79 07/24/2024 08:54 AM    ASTSGOT 17 07/24/2024 08:54 AM    ALTSGPT 18 07/24/2024 08:54 AM    TBILIRUBIN 1.2 07/24/2024 08:54 AM    ALBUMIN 4.5 07/24/2024 08:54 AM        Physical Examination:  Vital signs: Resp 14   Ht 1.753 m (5' 9\")   Wt 97.1 kg (214 lb)   BMI 31.60 kg/m²  Body mass index is 31.6 kg/m².    Assessment and Plan:    1. DM2  Discussed Goals: FBG 80 - 130, 2hPP < 180, a1c < 7%   Most recent a1c drawn today was 6.6%, which is  at goal and decreased from last (6.7% on 11/13/24).  Pt reported FBG are slightly above goal, but current A1c at goal. Discussed with patient appropriate lifestyle measures to help support glycemic control.   Pt to try to go for 10-15 minute walk after dinner to assist in bringing FBG values to goal.     - Medication changes:  None  - Continue:  Metformin 500 mg BID   Ozempic 2mg SQ weekly    - Lifestyle changes:  Diet: Maximize lean proteins and veggies. Cut out/down on carbs. Avoid simple sugars.   Exercise: Increase as tolerated  Focus on increasing HR during walks    - Preventative management:  REC DM Score: 0   Care gaps addressed: N/a  Care gaps updated in Health Maintenance    Follow Up:  3 months for A1c    Paul Christy, PharmD    CC:   Wilfredo Travis M.D.    "

## 2025-04-17 ENCOUNTER — OFFICE VISIT (OUTPATIENT)
Dept: URGENT CARE | Facility: CLINIC | Age: 70
End: 2025-04-17
Payer: MEDICARE

## 2025-04-17 VITALS
HEART RATE: 87 BPM | BODY MASS INDEX: 33.34 KG/M2 | TEMPERATURE: 97.5 F | OXYGEN SATURATION: 97 % | RESPIRATION RATE: 16 BRPM | HEIGHT: 68 IN | DIASTOLIC BLOOD PRESSURE: 74 MMHG | SYSTOLIC BLOOD PRESSURE: 122 MMHG | WEIGHT: 220 LBS

## 2025-04-17 DIAGNOSIS — H81.12 BENIGN PAROXYSMAL POSITIONAL VERTIGO OF LEFT EAR: ICD-10-CM

## 2025-04-17 PROCEDURE — 3074F SYST BP LT 130 MM HG: CPT | Performed by: PHYSICIAN ASSISTANT

## 2025-04-17 PROCEDURE — 3078F DIAST BP <80 MM HG: CPT | Performed by: PHYSICIAN ASSISTANT

## 2025-04-17 PROCEDURE — 99214 OFFICE O/P EST MOD 30 MIN: CPT | Performed by: PHYSICIAN ASSISTANT

## 2025-04-17 RX ORDER — MECLIZINE HYDROCHLORIDE 25 MG/1
25 TABLET ORAL 3 TIMES DAILY PRN
Qty: 30 TABLET | Refills: 0 | Status: SHIPPED | OUTPATIENT
Start: 2025-04-17

## 2025-04-17 ASSESSMENT — ENCOUNTER SYMPTOMS
LOSS OF CONSCIOUSNESS: 0
TINGLING: 0
BLURRED VISION: 0
NUMBNESS: 0
SPEECH CHANGE: 0
FATIGUE: 0
HEADACHES: 0
FEVER: 0
ANOREXIA: 0
SORE THROAT: 0
WEAKNESS: 0
COUGH: 0
VISUAL CHANGE: 0
PALPITATIONS: 0
DOUBLE VISION: 0
DIAPHORESIS: 0
ABDOMINAL PAIN: 0
DIARRHEA: 0
SHORTNESS OF BREATH: 0
CHILLS: 0
NAUSEA: 1
VOMITING: 1
DIZZINESS: 1
FOCAL WEAKNESS: 0
VERTIGO: 1
SENSORY CHANGE: 0
WHEEZING: 0

## 2025-04-17 NOTE — PROGRESS NOTES
Subjective     Julian Sellers Jr is a 69 y.o. male who presents with Dizziness (Woke up dizzy. Has has problem a history of vertigo  years ago.  )            Dizziness  This is a new problem. The current episode started today (Patient woke up with dizziness this morning. Symptoms worse with turning head to left. Patient feels unsteady with walking.). The problem occurs intermittently. The problem has been waxing and waning. Associated symptoms include nausea, vertigo and vomiting (dizzines caused patient to become nauseous and vomit this am). Pertinent negatives include no abdominal pain, anorexia, chest pain, chills, congestion, coughing, diaphoresis, fatigue, fever, headaches, numbness, rash, sore throat, urinary symptoms, visual change or weakness. Exacerbated by: turning head to the left and walking. He has tried nothing for the symptoms.     Patient reports history of vertigo x 10 years ago. His symptoms feel similar.    History reviewed. No pertinent past medical history.  History reviewed. No pertinent surgical history.    History reviewed. No pertinent family history.    Patient has no known allergies.    Medications, Allergies, and current problem list reviewed today in Epic    Review of Systems   Constitutional:  Negative for chills, diaphoresis, fatigue, fever and malaise/fatigue.   HENT:  Negative for congestion, ear discharge, ear pain and sore throat.    Eyes:  Negative for blurred vision and double vision.   Respiratory:  Negative for cough, shortness of breath and wheezing.    Cardiovascular:  Negative for chest pain, palpitations and leg swelling.   Gastrointestinal:  Positive for nausea and vomiting (dizzines caused patient to become nauseous and vomit this am). Negative for abdominal pain, anorexia and diarrhea.   Skin:  Negative for rash.   Neurological:  Positive for dizziness and vertigo. Negative for tingling, sensory change, speech change, focal weakness, loss of consciousness, weakness,  "numbness and headaches.        All other systems reviewed and are negative.         Objective     /74 (BP Location: Left arm, Patient Position: Sitting, BP Cuff Size: Adult)   Pulse 87   Temp 36.4 °C (97.5 °F) (Temporal)   Resp 16   Ht 1.727 m (5' 8\")   Wt 99.8 kg (220 lb)   SpO2 97%   BMI 33.45 kg/m²      Physical Exam  Constitutional:       General: He is not in acute distress.     Appearance: Normal appearance. He is not ill-appearing.   HENT:      Head: Normocephalic and atraumatic.      Right Ear: Tympanic membrane, ear canal and external ear normal.      Left Ear: Tympanic membrane, ear canal and external ear normal.   Eyes:      Conjunctiva/sclera: Conjunctivae normal.      Pupils: Pupils are equal, round, and reactive to light.   Cardiovascular:      Rate and Rhythm: Normal rate and regular rhythm.   Pulmonary:      Effort: Pulmonary effort is normal. No respiratory distress.      Breath sounds: No stridor. No wheezing.   Skin:     General: Skin is warm and dry.   Neurological:      General: No focal deficit present.      Mental Status: He is alert and oriented to person, place, and time.      Comments: CN II-XII intact. Cerebellar function  intact. Motor coordination intact.  strength strong and symmetrical bilaterally. Proprioception intact. Strength 5/5 equal in the upper and lower extremities. Facial features symmetric with equal movement. No focal deficits. Romberg negative.Dudley Hallpike positive on the left without nystagmus     Psychiatric:         Mood and Affect: Mood normal.         Behavior: Behavior normal.         Thought Content: Thought content normal.         Judgment: Judgment normal.                                  Assessment & Plan  Benign paroxysmal positional vertigo of left ear    Orders:    meclizine (ANTIVERT) 25 MG Tab; Take 1 Tablet by mouth 3 times a day as needed for Vertigo or Nausea/Vomiting.       -  AVS printed with home care instructions and red flags and " warning signs that warrant ER evaluation or immediate follow-up.    Recommend Modified Epley Maneuver on Youtube    Differential diagnoses, Supportive care, and indications for immediate follow-up discussed with patient.   Pathogenesis of diagnosis discussed including typical length and natural progression.   Instructed to return to clinic or nearest emergency department for any change in condition, further concerns, or worsening of symptoms.    The patient demonstrated a good understanding and agreed with the treatment plan.    Rosario Trotter P.A.-C.

## 2025-04-17 NOTE — PATIENT INSTRUCTIONS
Benign Positional Vertigo      YOUTUBE: Modified Epley Maneuver- left side.   Vertigo is the feeling that you or your surroundings are moving when they are not. Benign positional vertigo is the most common form of vertigo. This is usually a harmless condition (benign). This condition is positional. This means that symptoms are triggered by certain movements and positions.  This condition can be dangerous if it occurs while you are doing something that could cause harm to yourself or others. This includes activities such as driving or operating machinery.  What are the causes?  The inner ear has fluid-filled canals that help your brain sense movement and balance. When the fluid moves, the brain receives messages about your body's position.  With benign positional vertigo, calcium crystals in the inner ear break free and disturb the inner ear area. This causes your brain to receive confusing messages about your body's position.  What increases the risk?  You are more likely to develop this condition if:  You are a woman.  You are 50 years of age or older.  You have recently had a head injury.  You have an inner ear disease.  What are the signs or symptoms?  Symptoms of this condition usually happen when you move your head or your eyes in different directions. Symptoms may start suddenly and usually last for less than a minute. They include:  Loss of balance and falling.  Feeling like you are spinning or moving.  Feeling like your surroundings are spinning or moving.  Nausea and vomiting.  Blurred vision.  Dizziness.  Involuntary eye movement (nystagmus).  Symptoms can be mild and cause only minor problems, or they can be severe and interfere with daily life. Episodes of benign positional vertigo may return (recur) over time. Symptoms may also improve over time.  How is this diagnosed?  This condition may be diagnosed based on:  Your medical history.  A physical exam of the head, neck, and ears.  Positional tests to  check for or stimulate vertigo. You may be asked to turn your head and change positions, such as going from sitting to lying down. A health care provider will watch for symptoms of vertigo.  You may be referred to a health care provider who specializes in ear, nose, and throat problems (ENT or otolaryngologist) or a provider who specializes in disorders of the nervous system (neurologist).  How is this treated?    This condition may be treated in a session in which your health care provider moves your head in specific positions to help the displaced crystals in your inner ear move. Treatment for this condition may take several sessions. Surgery may be needed in severe cases, but this is rare.  In some cases, benign positional vertigo may resolve on its own in 2-4 weeks.  Follow these instructions at home:  Safety  Move slowly. Avoid sudden body or head movements or certain positions, as told by your health care provider.  Avoid driving or operating machinery until your health care provider says it is safe.  Avoid doing any tasks that would be dangerous to you or others if vertigo occurs.  If you have trouble walking or keeping your balance, try using a cane for stability. If you feel dizzy or unstable, sit down right away.  Return to your normal activities as told by your health care provider. Ask your health care provider what activities are safe for you.  General instructions  Take over-the-counter and prescription medicines only as told by your health care provider.  Drink enough fluid to keep your urine pale yellow.  Keep all follow-up visits. This is important.  Contact a health care provider if:  You have a fever.  Your condition gets worse or you develop new symptoms.  Your family or friends notice any behavioral changes.  You have nausea or vomiting that gets worse.  You have numbness or a prickling and tingling sensation.  Get help right away if you:  Have difficulty speaking or moving.  Are always dizzy or  faint.  Develop severe headaches.  Have weakness in your legs or arms.  Have changes in your hearing or vision.  Develop a stiff neck.  Develop sensitivity to light.  These symptoms may represent a serious problem that is an emergency. Do not wait to see if the symptoms will go away. Get medical help right away. Call your local emergency services (911 in the [place] Do not drive yourself to the hospital.  Summary  Vertigo is the feeling that you or your surroundings are moving when they are not. Benign positional vertigo is the most common form of vertigo.  This condition is caused by calcium crystals in the inner ear that become displaced. This causes a disturbance in an area of the inner ear that helps your brain sense movement and balance.  Symptoms include loss of balance and falling, feeling that you or your surroundings are moving, nausea and vomiting, and blurred vision.  This condition can be diagnosed based on symptoms, a physical exam, and positional tests.  Follow safety instructions as told by your health care provider and keep all follow-up visits. This is important.  This information is not intended to replace advice given to you by your health care provider. Make sure you discuss any questions you have with your health care provider.  Document Revised: 11/17/2021 Document Reviewed: 11/17/2021  Elsevier Patient Education © 2023 [employer]

## 2025-04-23 PROCEDURE — RXMED WILLOW AMBULATORY MEDICATION CHARGE: Performed by: FAMILY MEDICINE

## 2025-04-24 ENCOUNTER — PHARMACY VISIT (OUTPATIENT)
Dept: PHARMACY | Facility: MEDICAL CENTER | Age: 70
End: 2025-04-24
Payer: COMMERCIAL

## 2025-05-21 ENCOUNTER — OFFICE VISIT (OUTPATIENT)
Dept: MEDICAL GROUP | Facility: PHYSICIAN GROUP | Age: 70
End: 2025-05-21
Payer: MEDICARE

## 2025-05-21 VITALS
BODY MASS INDEX: 33.34 KG/M2 | RESPIRATION RATE: 14 BRPM | HEIGHT: 68 IN | WEIGHT: 220 LBS | OXYGEN SATURATION: 98 % | HEART RATE: 102 BPM

## 2025-05-21 DIAGNOSIS — E11.9 TYPE 2 DIABETES MELLITUS WITHOUT COMPLICATION, WITHOUT LONG-TERM CURRENT USE OF INSULIN (HCC): Primary | ICD-10-CM

## 2025-05-21 LAB
HBA1C MFR BLD: 6.8 % (ref ?–5.8)
POCT INT CON NEG: NEGATIVE
POCT INT CON POS: POSITIVE

## 2025-05-21 PROCEDURE — 99211 OFF/OP EST MAY X REQ PHY/QHP: CPT | Performed by: PHYSICIAN ASSISTANT

## 2025-05-21 PROCEDURE — 83036 HEMOGLOBIN GLYCOSYLATED A1C: CPT | Performed by: PHYSICIAN ASSISTANT

## 2025-05-21 NOTE — PROGRESS NOTES
Patient Consult Note    TIME IN: 8:55am  TIME OUT: 9:12am    Primary care physician: Wilfredo Travis M.D.    Reason for consult: Management of Uncontrolled Type 2 Diabetes    HPI:  Julian Sellers Jr is a 69 y.o. old patient who comes in today for evaluation of above stated problem.    Allergies  Patient has no known allergies.    Current Diabetes Medication Regimen  Metformin ER: 500 mg BID  GLP-1 or GLP-1/GIP Analog: semaglutide (Ozempic) 2 mg weekly    Previous Diabetes Medications and Reason for Discontinuation  Metformin ER 1500 mg/daily - unable to tolerate (was splitting ER tabs)    Potential Barriers to Care:  Adherence: denies missed doses.   Side effects: No issues   Affordability: Can afford Ozempic for now    SMBG  Pt has home glucometer and proper testing technique - Yes  Discussed BG Goals: FBG 80 - 130, 2hPP < 180, A1c < 7%    Pt reports blood sugars:    Before Breakfast: 130s-140s  (low 114, high 160s)    Hypoglycemia  Hypoglycemia awareness: Yes  Nocturnal hypoglycemia: None  Hypoglycemia:  None  Pt's treatment of Hypoglycemia  Discussed 15:15 Rule    Lifestyle  Exercise compromised by bout of vertigo over the last few weeks.  Able to get back to some light walking just yesterday.  Nutrition largely unchanged, but states he has not shifted to three small meals rather than two meals as he felt evening portions were getting too big.    Was walking 3-4x/week for 40 min. Reports that during winter he walks on treadmill and gets heart rates up above 100 bpm regularly.   Exercise Goal - Increase as tolerated, continue to exercise during winter     Dietary - Eating approximately 2 meals/day after starting GLP1  Brunch - Cereal, egg, zaidi  Dinner - Cooks at home. Vegetable and protein  Snacks - raw almonds, popcorn  Drinks - water, diet Coke, iced tea w/ artificial sweetener  Desserts - Cookies (animal crackers) once daily as a snack (handful) OR low-sugar/sugar free pudding    Preventative  "Management  BP regimen (ACEi/ARB): Lisinopril 5 mg once daily   Statin: atorvastatin (Lipitor) 40 mg daily  Last Microalbumin/Cr:  Lab Results   Component Value Date/Time    MALBCRT 15 07/24/2024 08:54 AM    MICROALBUR 3.1 07/24/2024 08:54 AM     Last A1c:  Lab Results   Component Value Date/Time    HBA1C 6.8 (A) 05/21/2025 08:56 AM      Last Retinal Scan: Completed 5/2024     Monofilament exam: Will address at next visit    Labs  Lab Results   Component Value Date/Time    SODIUM 140 07/24/2024 08:54 AM    POTASSIUM 4.7 07/24/2024 08:54 AM    CHLORIDE 103 07/24/2024 08:54 AM    CO2 25 07/24/2024 08:54 AM    GLUCOSE 167 (H) 07/24/2024 08:54 AM    BUN 13 07/24/2024 08:54 AM    CREATININE 0.88 07/24/2024 08:54 AM    GLOMRATE 86 06/18/2022 09:05 AM     Lab Results   Component Value Date/Time    ALKPHOSPHAT 79 07/24/2024 08:54 AM    ASTSGOT 17 07/24/2024 08:54 AM    ALTSGPT 18 07/24/2024 08:54 AM    TBILIRUBIN 1.2 07/24/2024 08:54 AM    ALBUMIN 4.5 07/24/2024 08:54 AM        Physical Examination:  Vital signs: Pulse (!) 102   Resp 14   Ht 1.727 m (5' 7.99\")   Wt 99.8 kg (220 lb)   SpO2 98%   BMI 33.46 kg/m²  Body mass index is 33.46 kg/m².    Assessment and Plan:    1. DM2  Discussed Goals: FBG 80 - 130, 2hPP < 180, a1c < 7%   Most recent a1c drawn today was 6.8%, which is  at goal and worsened from last (6.6% on 2/2025).  Pt reported FBG are consistent with previous visit and above goal for this patient.    Last couple weeks has suffered from acute vertigo, which has limited his activity levels significantly.  Just started back to walking yesterday  Pt to try to go for 10-15 minute walk after dinner to assist in bringing FBG values to goal.     - Medication changes:  None, but did discuss possibility of transition to Mounjaro or addition of SGLT2i should A1c worsen at next visit.  - Continue:  Metformin 500 mg BID   Ozempic 2mg SQ weekly    - Lifestyle changes:  Diet: Maximize lean proteins and veggies. Cut " out/down on carbs. Avoid simple sugars.   Exercise: Increase as tolerated for vertigo bouts  Focus on increasing HR during walks    - Preventative management:  REC DM Score:    Care gaps addressed: Need for updated retinal exam, has f/u with optometrist later this year  Care gaps updated in Health Maintenance    Follow Up:  3 months for A1c    Kody Pugh, PharmD, BCACP    CC:   Wilfredo Travis M.D.

## 2025-07-11 DIAGNOSIS — E11.9 DIABETES MELLITUS WITHOUT COMPLICATION (HCC): ICD-10-CM

## 2025-07-11 RX ORDER — SEMAGLUTIDE 2.68 MG/ML
2 INJECTION, SOLUTION SUBCUTANEOUS
Qty: 3 ML | Refills: 5 | Status: SHIPPED | OUTPATIENT
Start: 2025-07-11

## 2025-07-11 RX ORDER — SEMAGLUTIDE 2.68 MG/ML
2 INJECTION, SOLUTION SUBCUTANEOUS
Qty: 3 ML | Refills: 5 | Status: CANCELLED | OUTPATIENT
Start: 2025-07-11

## 2025-07-15 PROCEDURE — RXMED WILLOW AMBULATORY MEDICATION CHARGE: Performed by: FAMILY MEDICINE

## 2025-07-16 ENCOUNTER — PHARMACY VISIT (OUTPATIENT)
Dept: PHARMACY | Facility: MEDICAL CENTER | Age: 70
End: 2025-07-16
Payer: COMMERCIAL